# Patient Record
Sex: MALE | Race: WHITE | NOT HISPANIC OR LATINO | URBAN - METROPOLITAN AREA
[De-identification: names, ages, dates, MRNs, and addresses within clinical notes are randomized per-mention and may not be internally consistent; named-entity substitution may affect disease eponyms.]

---

## 2018-06-07 ENCOUNTER — EMERGENCY (EMERGENCY)
Facility: HOSPITAL | Age: 73
LOS: 1 days | Discharge: HOME | End: 2018-06-07
Admitting: EMERGENCY MEDICINE
Payer: MEDICARE

## 2018-06-07 VITALS
OXYGEN SATURATION: 96 % | DIASTOLIC BLOOD PRESSURE: 81 MMHG | RESPIRATION RATE: 17 BRPM | SYSTOLIC BLOOD PRESSURE: 145 MMHG | TEMPERATURE: 98 F | HEART RATE: 76 BPM
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
## 2018-06-07 DIAGNOSIS — S01.81XA LACERATION WITHOUT FOREIGN BODY OF OTHER PART OF HEAD, INITIAL ENCOUNTER: ICD-10-CM
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
## 2018-06-07 DIAGNOSIS — I10 ESSENTIAL (PRIMARY) HYPERTENSION: ICD-10-CM

## 2018-06-07 PROCEDURE — 70450 CT HEAD/BRAIN W/O DYE: CPT | Mod: 26

## 2018-06-07 PROCEDURE — 12013 RPR F/E/E/N/L/M 2.6-5.0 CM: CPT

## 2018-06-07 PROCEDURE — 99284 EMERGENCY DEPT VISIT MOD MDM: CPT | Mod: 25

## 2018-06-07 NOTE — ED PROVIDER NOTE - MEDICAL DECISION MAKING DETAILS
s/p mechanical fall trip and fall, here with lac to chin, no other injuries, tetanus utd, ct brain neg, lac repaired successfully with no complications, return in 7 days for suture removal, wound care discussed, strict return precautions discussed

## 2018-06-07 NOTE — ED PROVIDER NOTE - OBJECTIVE STATEMENT
71 yo M with hx hypothyroidism, copd, HTN on baby asa, here s/p trip and fall, sustained laceration to chin a few hours ago. No LOC. no headache, no dizziness. no nausea or vomiting. No neck pain, no back pain, no chest pain or abdominal pain. Last tetanus 4 years ago.

## 2018-06-07 NOTE — ED ADULT TRIAGE NOTE - CHIEF COMPLAINT QUOTE
pt with 1cm lac to chin. fell at [facility] in the past hour. denies pain. dressing applied. last tetanus 2014.

## 2018-06-07 NOTE — ED ADULT NURSE NOTE - CHIEF COMPLAINT QUOTE
pt with 1cm lac to chin. fell at [facility] in the past hour. denies pain. dressing applied. last tetanus 2014.

## 2020-08-05 ENCOUNTER — EMERGENCY (EMERGENCY)
Facility: HOSPITAL | Age: 75
LOS: 1 days | Discharge: HOME | End: 2020-08-05
Attending: EMERGENCY MEDICINE | Admitting: EMERGENCY MEDICINE
Payer: MEDICARE

## 2020-08-05 VITALS
TEMPERATURE: 98 F | DIASTOLIC BLOOD PRESSURE: 77 MMHG | HEART RATE: 86 BPM | WEIGHT: 188.94 LBS | RESPIRATION RATE: 18 BRPM | OXYGEN SATURATION: 98 % | SYSTOLIC BLOOD PRESSURE: 137 MMHG

## 2020-08-05 VITALS
OXYGEN SATURATION: 97 % | SYSTOLIC BLOOD PRESSURE: 133 MMHG | DIASTOLIC BLOOD PRESSURE: 77 MMHG | RESPIRATION RATE: 18 BRPM | TEMPERATURE: 98 F | HEART RATE: 66 BPM

## 2020-08-05 DIAGNOSIS — S00.81XA ABRASION OF OTHER PART OF HEAD, INITIAL ENCOUNTER: ICD-10-CM
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
## 2020-08-05 DIAGNOSIS — S62.603A FRACTURE OF UNSPECIFIED PHALANX OF LEFT MIDDLE FINGER, INITIAL ENCOUNTER FOR CLOSED FRACTURE: ICD-10-CM
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
## 2020-08-05 PROCEDURE — 73110 X-RAY EXAM OF WRIST: CPT | Mod: 26,LT

## 2020-08-05 PROCEDURE — 73130 X-RAY EXAM OF HAND: CPT | Mod: 26,LT

## 2020-08-05 PROCEDURE — 99284 EMERGENCY DEPT VISIT MOD MDM: CPT | Mod: 25

## 2020-08-05 PROCEDURE — 29125 APPL SHORT ARM SPLINT STATIC: CPT

## 2020-08-05 RX ORDER — ONDANSETRON 8 MG/1
4 TABLET, FILM COATED ORAL ONCE
Refills: 0 | Status: DISCONTINUED | OUTPATIENT
Start: 2020-08-05 | End: 2020-08-05

## 2020-08-05 NOTE — ED PROVIDER NOTE - CARE PROVIDER_API CALL
Mona Can  PLASTIC SURGERY  [address] [place] [id number]  Phone: [phone]  Fax: [phone]  Follow Up Time: Lucia Logan  PLASTIC SURGERY  [address] [place] [address]  Phone: [phone]  Fax: [phone]  Follow Up Time:

## 2020-08-05 NOTE — ED ADULT TRIAGE NOTE - CHIEF COMPLAINT QUOTE
pt ambulates with a baseline shuffle " secondary to psych meds" pt with a mechanical fall with injury to left wrist and hand- Pt denies head, neck, back injury. takes asa

## 2020-08-05 NOTE — ED PROVIDER NOTE - OBJECTIVE STATEMENT
73 yo M w/ history of psych disorder on medication w/ baseline shuffle (gait) due to meds presents to the ED with left wrist and left 3rd digit injury w/ swelling and bruising to L 3rd digit s/p mechanical fall today. Pt states he was walking fast when he tripped and fell, landing on his left hand - no prodromal symptoms. No LOC, visual changes, headstrike, N/V, or other injury. Pt with abrasion to left face, but attributes abrasion to wearing a mask. Pt endorses frequent falls 6-8 months ago that resolved after medication change. Pt takes aspirin daily.

## 2020-08-05 NOTE — ED PROVIDER NOTE - MUSCULOSKELETAL, MLM
Normal distal motor and sensory of the median, ulnar and radial nerves.  Motor intact for ED, JOE, EDM of the involved digit excluding thumb.  AP, APB, APL intact for thumb.  Sensation intact to light touch of the involved digit.  Cap refill < 3 seconds.

## 2020-08-05 NOTE — ED ADULT NURSE NOTE - OBJECTIVE STATEMENT
Pt lost his balance and fell, breaking his fall with hands. C/o bilateral hand pain worse on Rt side. Hematoma noted to base of Rt middle finger. Denies head strike or LOC

## 2020-08-05 NOTE — ED PROVIDER NOTE - NSFOLLOWUPINSTRUCTIONS_ED_ALL_ED_FT
:  [facility]             SPLINT CARE - AfterCare(R) Instructions(ER/ED)     Splint Care    WHAT YOU NEED TO KNOW:    Splint care is important to help protect your splint until it comes off. Some splints are made of fiberglass or plaster that will need to dry and harden. Splint care will help the splint dry and harden correctly. Even after your splint hardens, it can be damaged.    Call to schedule an appointment within 1 week    DISCHARGE INSTRUCTIONS:    Return to the emergency department if:     You have increased pain.      Your fingers or toes are numb or tingling.      You feel burning or stinging around your injury.      Your nails, fingers, or toes turn pale, blue, or gray, and feel cold.      You have new or increased trouble moving your fingers or toes.      Your swelling gets worse.      The skin under your splint is bleeding or leaking pus.     Contact your healthcare provider if:     Your hard splint gets wet or is damaged.      You have a fever.      Your splint feels tighter.      You have itchy, dry skin under your splint that is getting worse.      The skin under your splint is red, or you have a new sore.      You notice a bad smell coming from your splint.       You have questions or concerns about your condition or care.    How to care for your splint:     Wait for your hard splint to harden completely. You may have to wait up to 3 days before you can walk on a plaster splint.      Check your splint and the skin around it each day. Check your splint for damage, such as cracks and breaks. Check your skin for redness, increased swelling, and sores. Loosen the elastic bandage around your splint if it feels too tight.      Keep your splint clean and dry. Keep dirt out of your splint. Before you bathe, wrap your hard splint with 2 layers of plastic. Then put a plastic bag over it. Keep the plastic bag tightly sealed. You can also ask your healthcare provider about waterproof shields. Do not put your hard splint in the water, even with a plastic bag over it. A wet splint can make your skin itchy, and may lead to infection.      Do not put powders or deodorants inside your splint. These can dry your skin and increase itching.       Do not try to scratch the skin inside your hard splint with sharp objects. Sharp objects can break off inside your splint or hurt your skin.       Do not pull the padding out of your splint. The padding inside your splint protects your skin. You may develop a sore on your skin if you take out the padding.    Follow up with your healthcare provider as directed within 1 to 2 weeks: Write down your questions so you remember to ask them during your visits.        © Copyright [employer] 2020       back to top                      © Copyright [employer] 2020

## 2020-08-05 NOTE — ED ADULT NURSE NOTE - NSIMPLEMENTINTERV_GEN_ALL_ED
Implemented All Fall Risk Interventions:  [place] to call system. Call bell, personal items and telephone within reach. Instruct patient to call for assistance. Room bathroom lighting operational. Non-slip footwear when patient is off stretcher. Physically safe environment: no spills, clutter or unnecessary equipment. Stretcher in lowest position, wheels locked, appropriate side rails in place. Provide visual cue, wrist band, yellow gown, etc. Monitor gait and stability. Monitor for mental status changes and reorient to person, place, and time. Review medications for side effects contributing to fall risk. Reinforce activity limits and safety measures with patient and family.

## 2020-08-05 NOTE — ED PROVIDER NOTE - CLINICAL SUMMARY MEDICAL DECISION MAKING FREE TEXT BOX
Pt presents to the ED w/ left hand/wrist injury s/p mechanical fall today. Pt is not complaining of pain and declined analgesics. Will order left hand and wrist xray to rule out fracture. will reassess. Pt presents to the ED w/ left hand/wrist injury s/p mechanical fall today. Pt is not complaining of pain and declined analgesics. Will order left hand and wrist xray to rule out fracture. will reassess.    Patient with L third phalynx fracture. Discussed with hand, splint and outpatient follow up. Patient refusing pain medication. Splinted by me.

## 2020-08-05 NOTE — ED PROVIDER NOTE - PATIENT PORTAL LINK FT
You can access the FollowMyHealth Patient Portal offered by [facility] by registering at the following website: [email or web address]. By joining [employer]’s FollowMyHealth portal, you will also be able to view your health information using other applications (apps) compatible with our system.

## 2020-09-10 NOTE — ED PROCEDURE NOTE - CPROC ED POST RADIOGRAPHY1
Attempting to reach patient for a follow up care coordination call regarding any needs, questions or concerns.   Ann Mcpherson, [address]
extremity correctly positioned, splinted/post procedure radiography not performed

## 2021-03-03 ENCOUNTER — EMERGENCY (EMERGENCY)
Facility: HOSPITAL | Age: 76
LOS: 1 days | Discharge: HOME | End: 2021-03-03
Attending: EMERGENCY MEDICINE | Admitting: EMERGENCY MEDICINE
Payer: MEDICARE

## 2021-03-03 VITALS
WEIGHT: 175.05 LBS | HEART RATE: 92 BPM | RESPIRATION RATE: 18 BRPM | SYSTOLIC BLOOD PRESSURE: 174 MMHG | OXYGEN SATURATION: 94 % | HEIGHT: 70 IN | DIASTOLIC BLOOD PRESSURE: 71 MMHG | TEMPERATURE: 98 F

## 2021-03-03 VITALS
OXYGEN SATURATION: 98 % | DIASTOLIC BLOOD PRESSURE: 78 MMHG | SYSTOLIC BLOOD PRESSURE: 188 MMHG | TEMPERATURE: 98 F | RESPIRATION RATE: 18 BRPM | HEART RATE: 86 BPM

## 2021-03-03 DIAGNOSIS — J45.909 UNSPECIFIED ASTHMA, UNCOMPLICATED: ICD-10-CM

## 2021-03-03 DIAGNOSIS — K40.90 UNILATERAL INGUINAL HERNIA, WITHOUT OBSTRUCTION OR GANGRENE, NOT SPECIFIED AS RECURRENT: ICD-10-CM

## 2021-03-03 DIAGNOSIS — E78.5 HYPERLIPIDEMIA, UNSPECIFIED: ICD-10-CM

## 2021-03-03 DIAGNOSIS — E03.9 HYPOTHYROIDISM, UNSPECIFIED: ICD-10-CM

## 2021-03-03 DIAGNOSIS — R10.31 RIGHT LOWER QUADRANT PAIN: ICD-10-CM

## 2021-03-03 LAB
ALBUMIN SERPL ELPH-MCNC: 3.8 G/DL — SIGNIFICANT CHANGE UP (ref 3.4–5)
ALP SERPL-CCNC: 68 U/L — SIGNIFICANT CHANGE UP (ref 40–120)
ALT FLD-CCNC: 32 U/L — SIGNIFICANT CHANGE UP (ref 12–42)
ANION GAP SERPL CALC-SCNC: 7 MMOL/L — LOW (ref 9–16)
APTT BLD: 33.4 SEC — SIGNIFICANT CHANGE UP (ref 27.5–35.5)
AST SERPL-CCNC: 44 U/L — HIGH (ref 15–37)
BASOPHILS # BLD AUTO: 0.04 K/UL — SIGNIFICANT CHANGE UP (ref 0–0.2)
BASOPHILS NFR BLD AUTO: 0.7 % — SIGNIFICANT CHANGE UP (ref 0–2)
BILIRUB SERPL-MCNC: 0.3 MG/DL — SIGNIFICANT CHANGE UP (ref 0.2–1.2)
BUN SERPL-MCNC: 25 MG/DL — HIGH (ref 7–23)
CALCIUM SERPL-MCNC: 9.1 MG/DL — SIGNIFICANT CHANGE UP (ref 8.5–10.5)
CHLORIDE SERPL-SCNC: 107 MMOL/L — SIGNIFICANT CHANGE UP (ref 96–108)
CO2 SERPL-SCNC: 26 MMOL/L — SIGNIFICANT CHANGE UP (ref 22–31)
CREAT SERPL-MCNC: 1.36 MG/DL — HIGH (ref 0.5–1.3)
EOSINOPHIL # BLD AUTO: 0.25 K/UL — SIGNIFICANT CHANGE UP (ref 0–0.5)
EOSINOPHIL NFR BLD AUTO: 4.2 % — SIGNIFICANT CHANGE UP (ref 0–6)
GLUCOSE SERPL-MCNC: 115 MG/DL — HIGH (ref 70–99)
HCT VFR BLD CALC: 37.5 % — LOW (ref 39–50)
HGB BLD-MCNC: 12.6 G/DL — LOW (ref 13–17)
IMM GRANULOCYTES NFR BLD AUTO: 0.5 % — SIGNIFICANT CHANGE UP (ref 0–1.5)
INR BLD: 1.04 — SIGNIFICANT CHANGE UP (ref 0.88–1.16)
LIDOCAIN IGE QN: 49 U/L — LOW (ref 73–393)
LYMPHOCYTES # BLD AUTO: 1.03 K/UL — SIGNIFICANT CHANGE UP (ref 1–3.3)
LYMPHOCYTES # BLD AUTO: 17.3 % — SIGNIFICANT CHANGE UP (ref 13–44)
MCHC RBC-ENTMCNC: 30.2 PG — SIGNIFICANT CHANGE UP (ref 27–34)
MCHC RBC-ENTMCNC: 33.6 GM/DL — SIGNIFICANT CHANGE UP (ref 32–36)
MCV RBC AUTO: 89.9 FL — SIGNIFICANT CHANGE UP (ref 80–100)
MONOCYTES # BLD AUTO: 0.56 K/UL — SIGNIFICANT CHANGE UP (ref 0–0.9)
MONOCYTES NFR BLD AUTO: 9.4 % — SIGNIFICANT CHANGE UP (ref 2–14)
NEUTROPHILS # BLD AUTO: 4.06 K/UL — SIGNIFICANT CHANGE UP (ref 1.8–7.4)
NEUTROPHILS NFR BLD AUTO: 67.9 % — SIGNIFICANT CHANGE UP (ref 43–77)
NRBC # BLD: 0 /100 WBCS — SIGNIFICANT CHANGE UP (ref 0–0)
PLATELET # BLD AUTO: 130 K/UL — LOW (ref 150–400)
POTASSIUM SERPL-MCNC: 3.7 MMOL/L — SIGNIFICANT CHANGE UP (ref 3.5–5.3)
POTASSIUM SERPL-SCNC: 3.7 MMOL/L — SIGNIFICANT CHANGE UP (ref 3.5–5.3)
PROT SERPL-MCNC: 6.8 G/DL — SIGNIFICANT CHANGE UP (ref 6.4–8.2)
PROTHROM AB SERPL-ACNC: 12.2 SEC — SIGNIFICANT CHANGE UP (ref 10.6–13.6)
RBC # BLD: 4.17 M/UL — LOW (ref 4.2–5.8)
RBC # FLD: 13.2 % — SIGNIFICANT CHANGE UP (ref 10.3–14.5)
SODIUM SERPL-SCNC: 140 MMOL/L — SIGNIFICANT CHANGE UP (ref 132–145)
WBC # BLD: 5.97 K/UL — SIGNIFICANT CHANGE UP (ref 3.8–10.5)
WBC # FLD AUTO: 5.97 K/UL — SIGNIFICANT CHANGE UP (ref 3.8–10.5)

## 2021-03-03 PROCEDURE — 99284 EMERGENCY DEPT VISIT MOD MDM: CPT

## 2021-03-03 PROCEDURE — 74177 CT ABD & PELVIS W/CONTRAST: CPT | Mod: 26,MH

## 2021-03-03 RX ORDER — IOHEXOL 300 MG/ML
30 INJECTION, SOLUTION INTRAVENOUS ONCE
Refills: 0 | Status: COMPLETED | OUTPATIENT
Start: 2021-03-03 | End: 2021-03-03

## 2021-03-03 RX ORDER — SODIUM CHLORIDE 9 MG/ML
1000 INJECTION INTRAMUSCULAR; INTRAVENOUS; SUBCUTANEOUS ONCE
Refills: 0 | Status: COMPLETED | OUTPATIENT
Start: 2021-03-03 | End: 2021-03-03

## 2021-03-03 RX ADMIN — SODIUM CHLORIDE 1000 MILLILITER(S): 9 INJECTION INTRAMUSCULAR; INTRAVENOUS; SUBCUTANEOUS at 13:17

## 2021-03-03 RX ADMIN — IOHEXOL 30 MILLILITER(S): 300 INJECTION, SOLUTION INTRAVENOUS at 13:17

## 2021-03-03 NOTE — ED ADULT TRIAGE NOTE - CHIEF COMPLAINT QUOTE
Pt sent by PMD for eval of constipation x2 weeks. Pt states he took lactulose 4 days ago with only 2 small bowel movements as result. Pt reports RLQ pain.

## 2021-03-03 NOTE — ED PROVIDER NOTE - PHYSICAL EXAMINATION
VITAL SIGNS: I have reviewed nursing notes and confirm.  CONSTITUTIONAL: Well-developed; well-nourished; in no acute distress.  SKIN: Skin is warm and dry, no acute rash.  HEAD: Normocephalic; atraumatic.  EYES: PERRL, EOM intact; conjunctiva and sclera clear.  ENT: No nasal discharge; airway clear.  NECK: Supple; non tender.  CARD: S1, S2 normal; no murmurs, gallops, or rubs. Regular rate and rhythm.  RESP: No wheezes, rales or rhonchi.  ABD: soft non distention with tenderness to RLQ right lingual area, no hernias noted on exam  EXT: Normal ROM. No clubbing, cyanosis or edema.  NEURO: Alert, oriented. Grossly unremarkable.  PSYCH: Cooperative, appropriate. VITAL SIGNS: I have reviewed nursing notes and confirm.  CONSTITUTIONAL: Well-developed; well-nourished; in no acute distress.  SKIN: Skin is warm and dry, no acute rash.  HEAD: Normocephalic; atraumatic.  EYES: PERRL, EOM intact; conjunctiva and sclera clear.  ENT: No nasal discharge; airway clear.  NECK: Supple; non tender.  CARD: S1, S2 normal; no murmurs, gallops, or rubs. Regular rate and rhythm.  RESP: No wheezes, rales or rhonchi.  ABD: Abdomen is soft, nondistended, with +tenderness to RLQ and right inguinal area. No hernias noted on exam.  EXT: Normal ROM. No clubbing, cyanosis or edema.  NEURO: Alert, oriented. Grossly unremarkable.  PSYCH: Cooperative, appropriate.

## 2021-03-03 NOTE — ED PROVIDER NOTE - OBJECTIVE STATEMENT
74 y/o male with PMHx of schizoaffective disorder, hypothyroidism, Hyperlipidemia, BPH, and Asthma presents to the ED complaining of RLQ pain for the past few days. Pt reports he has been constipated over the last 2 weeks and reports only having 2 small BM recently, 2/2 lactulose. However, patient became concerned when he developed RLQ pain just above the right inguinal line. Patient denies any hernias or visual bulging around the area. Pain is exacerbate by pressure or palpation. Denies any associated fevers chills N/V, anorexia, dysuria, hematuria or other concerns. Patient was [facility]. 76 y/o male with PMHx of schizoaffective disorder, hypothyroidism, HLD, BPH, and asthma presents to the ED complaining of RLQ pain for the past few days. Pt reports he has been constipated over the last 2 weeks and reports only having 2 small BM recently 2/2 lactulose. However, Patient became concerned when he developed RLQ pain just above the right inguinal line. Patient denies any hernias or visual bulging around the area. Pain is exacerbated by pressure or palpation. Denies any associated fevers, chills, N/V, anorexia, dysuria, hematuria or other concerns. Patient was sent from [facility].

## 2021-03-03 NOTE — ED PROVIDER NOTE - CLINICAL SUMMARY MEDICAL DECISION MAKING FREE TEXT BOX
DDx includes appendicitis vs. constipation vs. small inguinal hernia that is not evident on exam. Will perform labs, CT Abdominal/Pelvis with IV and PO contrast. DDx includes appendicitis vs. constipation vs. small inguinal hernia that is not evident on exam. Will perform labs and CT abdomen/pelvis with IV and PO contrast.

## 2021-03-03 NOTE — ED PROVIDER NOTE - PATIENT PORTAL LINK FT
You can access the FollowMyHealth Patient Portal offered by [facility] by registering at the following website: [email or web address]. By joining [employer]’s FollowMyHealth portal, you will also be able to view your health information using other applications (apps) compatible with our system.

## 2021-03-03 NOTE — ED PROVIDER NOTE - PROGRESS NOTE DETAILS
All results and hernia dx discussed with pt.  He will get f/u with surgery - consult placed with care coordinator.  Discussed care of hernia and emergent return precautions.

## 2021-03-03 NOTE — ED PROVIDER NOTE - PMH
Asthma    BPH (benign prostatic hyperplasia)    Hyperlipidemia    Hypothyroidism    Schizoaffective disorder

## 2021-03-03 NOTE — ED PROVIDER NOTE - CHPI ED SYMPTOMS NEG
, No anorexia, no hematuria/no dysuria/no fever/no hematuria/no nausea/no vomiting/no chills no anorexia, no hematuria/no dysuria/no fever/no hematuria/no nausea/no vomiting/no chills

## 2021-03-03 NOTE — ED PROVIDER NOTE - NSFOLLOWUPINSTRUCTIONS_ED_ALL_ED_FT
PLEASE FOLLOW-UP WITH OUR GENERAL SURGEONS SO THEY CAN EVALUATE YOUR HERNIA.   YOU MAY CALL [phone] AND ASK FOR MS. LAZARADARNELL WALLACE.  SHE CAN HELP YOU MAKE A FOLLOW-UP APPOINTMENT.    -TAKE OVER THE COUNTER TYLENOL 650MG BY MOUTH EVERY 4-6 HOURS AS NEEDED FOR PAIN.  DO NOT MIX WITH ALCOHOL OR OTHER PRESCRIPTION MEDICATIONS THAT ALREADY CONTAIN TYLENOL OR ACETAMINOPHEN.   -PLEASE RETURN TO THE ER IMMEDIATELY OR CALL 911 FOR ANY HIGH FEVER, TROUBLE BREATHING, VOMITING, SEVERE PAIN, OR ANY OTHER CONCERNS.      Inguinal Hernia, Adult    An inguinal hernia develops when fat or the intestines push through a weak spot in a muscle where your leg meets your lower abdomen (groin). This creates a bulge. This kind of hernia could also be:  •In your scrotum, if you are male.      •In folds of skin around your vagina, if you are female.    There are three types of inguinal hernias:  •Hernias that can be pushed back into the abdomen (are reducible). This type rarely causes pain.      •Hernias that are not reducible (are incarcerated).      •Hernias that are not reducible and lose their blood supply (are strangulated). This type of hernia requires emergency surgery.        What are the causes?  This condition is caused by having a weak spot in the muscles or tissues in the groin. This weak spot develops over time. The hernia may poke through the weak spot when you suddenly strain your lower abdominal muscles, such as when you:  •Lift a heavy object.      •Strain to have a bowel movement. Constipation can lead to straining.      •Cough.        What increases the risk?  This condition is more likely to develop in:  •Men.      •Pregnant women.    •People who:  •Are overweight.      •Work in jobs that require long periods of standing or heavy lifting.      •Have had an inguinal hernia before.      •Smoke or have lung disease. These factors can lead to long-lasting (chronic) coughing.          What are the signs or symptoms?  Symptoms may depend on the size of the hernia. Often, a small inguinal hernia has no symptoms. Symptoms of a larger hernia may include:  •A lump in the groin area. This is easier to see when standing. It might not be visible when lying down.      •Pain or burning in the groin. This may get worse when lifting, straining, or coughing.      •A dull ache or a feeling of pressure in the groin.      •In men, an unusual lump in the scrotum.    Symptoms of a strangulated inguinal hernia may include:  •A bulge in your groin that is very painful and tender to the touch.      •A bulge that turns red or purple.      •Fever, nausea, and vomiting.      •Inability to have a bowel movement or to pass gas.        How is this diagnosed?    This condition is diagnosed based on your symptoms, your medical history, and a physical exam. Your health care provider may feel your groin area and ask you to cough.      How is this treated?    Treatment depends on the size of your hernia and whether you have symptoms. If you do not have symptoms, your health care provider may have you watch your hernia carefully and have you come in for follow-up visits. If your hernia is large or if you have symptoms, you may need surgery to repair the hernia.      Follow these instructions at home:    Lifestyle     •Avoid lifting heavy objects.      •Avoid standing for long periods of time.      • Do not use any products that contain nicotine or tobacco, such as cigarettes and e-cigarettes. If you need help quitting, ask your health care provider.      •Maintain a healthy weight.      Preventing constipation   •Take actions to prevent constipation. Constipation leads to straining with bowel movements, which can make a hernia worse or cause a hernia repair to break down. Your health care provider may recommend that you:  •Drink enough fluid to keep your urine pale yellow.      •Eat foods that are high in fiber, such as fresh fruits and vegetables, whole grains, and beans.      •Limit foods that are high in fat and processed sugars, such as fried or sweet foods.      •Take an over-the-counter or prescription medicine for constipation.        General instructions     •You may try to push the hernia back in place by very gently pressing on it while lying down. Do not try to force the bulge back in if it will not push in easily.      •Watch your hernia for any changes in shape, size, or color. Get help right away if you notice any changes.      •Take over-the-counter and prescription medicines only as told by your health care provider.      •Keep all follow-up visits as told by your health care provider. This is important.        Contact a health care provider if:    •You have a fever.      •You develop new symptoms.      •Your symptoms get worse.        Get help right away if:    •You have pain in your groin that suddenly gets worse.    •You have a bulge in your groin that:  •Suddenly gets bigger and does not get smaller.      •Becomes red or purple or painful to the touch.        •You are a man and you have a sudden pain in your scrotum, or the size of your scrotum suddenly changes.      •You cannot push the hernia back in place by very gently pressing on it when you are lying down. Do not try to force the bulge back in if it will not push in easily.      •You have nausea or vomiting that does not go away.      •You have a fast heartbeat.      •You cannot have a bowel movement or pass gas.      These symptoms may represent a serious problem that is an emergency. Do not wait to see if the symptoms will go away. Get medical help right away. Call your local emergency services (911 in the [place]       Summary    •An inguinal hernia develops when fat or the intestines push through a weak spot in a muscle where your leg meets your lower abdomen (groin).      •This condition is caused by having a weak spot in muscles or tissue in your groin.      •Symptoms may depend on the size of the hernia, and they may include pain or swelling in your groin. A small inguinal hernia often has no symptoms.      •Treatment may not be needed if you do not have symptoms. If you have symptoms or a large hernia, you may need surgery to repair the hernia.      •Avoid lifting heavy objects. Also avoid standing for long amounts of time.      This information is not intended to replace advice given to you by your health care provider. Make sure you discuss any questions you have with your health care provider.

## 2021-03-03 NOTE — ED ADULT NURSE NOTE - OBJECTIVE STATEMENT
Pt is a 75y male complaining of RLQ pain. Pt states that he has been constipated x 10 days. Pt states that he saw his PCP and was prescribed lactulose. Pt states lbm was 5 days ago. Pt denies nausea, vomiting, diarrhea.

## 2021-06-28 NOTE — ED PROVIDER NOTE - CARDIOVASCULAR NEGATIVE STATEMENT, MLM
"Progress Notes by Cain Worthy MD at 2/11/2020  9:20 AM     Author: Cain Worthy MD Service: -- Author Type: Physician    Filed: 2/11/2020 11:07 AM Encounter Date: 2/11/2020 Status: Signed    : Cain Worthy MD (Physician)         .      Assessment/Recommendations   Patient with known hypertension for at least 5 years who has been treated with enalapril once daily 2.5 mg.  His blood pressures been creeping up and he has several blood pressures greater than 150 and some in the 170s with diastolics close to 100.  He has not had blood work recently and also has a mildly elevated calcium score in 2013 at 17.    I have recommend that we increase his antihypertensive regimen and discontinue his enalapril and start him on lisinopril 5 mg each day.  I have asked him to call us with some blood pressures after his been on this medication for a week or 2 and we can decide if he needs a larger dose.  We will get a complete metabolic panel, CBC, urinalysis and a testosterone level as well as a fasting lipid panel tomorrow.  We will call him with the results and further recommendations.  With the increased dose of lisinopril I would recommend repeating a basic metabolic panel in a couple of weeks.    I have strongly encouraged him to do exercise for 30 minutes, at least 5 times a week.  We talked also about reducing carbohydrates in his diet and substituting protein.  He will take these recommendations under advisement but I think he is motivated to have some lifestyle changes.    I have recommended that he read the book \"the science of a long life\" by Dr. Lupe Martinez.    I would like to see him back in 6 months, but of course would be happy to see him sooner if questions or problems arise.       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 male with known history of hypertension for at least 5 years.  He was placed on enalapril about 5 years ago when he had an orchiectomy " from a torsion issue.  He believes that over the last 5 years he is gained about 30 pounds.  He maintains an active lifestyle with snow shoveling and mowing the lawn and being physically active but does not exercise on a routine basis and does not have an exercise routine.  He reports that he has been to the dentist a couple of times and his blood pressures been significantly elevated.  Diastolic pressures were close to 100 and systolic pressures into the 170s or even higher.  He brought a blood pressure cuff and he has blood pressures which are consistently above 150 systolic and oftentimes in the 90s diastolic.    He feels well.  He denies unusual dyspnea on exertion and also denies chest discomfort or chest tightness of any type.  He denies orthopnea, paroxysmal nocturnal dyspnea, peripheral edema, syncope or near syncopal episodes.  In the past he has had palpitations but of late he is not really had any palpitations.    He has been treated for hypertension, his father has coronary artery disease which is diagnosed late in life.  The patient is not diabetic, does not smoke cigarettes and has been on our atorvastatin for his lipids but has not had a lipid panel for some time.  He typically will go for an executive physical at the [facility] every couple of years.    He did have a calcium score of 17 almost all of which was in the proximal LAD according to the report from the [facility] in 2013.  He had a stress test thereafter which he tells me was unremarkable although I cannot view the report in care everywhere.    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    He is , but does not have children.           Physical Examination Review of Systems   Vitals:    02/11/20 0909   BP: 128/74   Pulse: 72   Resp: 16     There is no height or weight on file to  calculate BMI.  Wt Readings from Last 3 Encounters:   02/11/20 (!) 247 lb (112 kg)     General Appearance:   Alert, cooperative and in no acute distress.   ENT/Mouth: Oral mucuos membranes pink and moist .      EYES:  no scleral icterus, normal conjunctivae   Neck: JVP normal. No Hepatojugular reflux. Thyroid not visualized.   Chest/Lungs:   Lungs are clear to auscultation, equal chest wall expansion.   Cardiovascular:   S1, S2 without murmur ,clicks or rubs. Brachial, radial and posterior tibial pulses are intact and symetric. No carotid bruits noted   Abdomen:  Nontender. BS+. No bruits.   Extremities: No cyanosis, clubbing or edema   Skin: no xanthelasma, warm.    Neurologic: normal  bilateral, no tremors     Psychiatric: Appropriate affect.      General: WNL  Eyes: WNL  Ears/Nose/Throat: WNL  Lungs: WNL  Heart: WNL  Stomach: WNL  Bladder: WNL  Muscle/Joints: WNL  Skin: WNL  Nervous System: WNL  Mental Health: WNL     Blood: WNL       Medical History  Surgical History Family History Social History   No past medical history on file. No past surgical history on file.   Status post orchiectomy No family history on file.     Father has had bypass surgery Social History     Socioeconomic History   ? Marital status: Unknown     Spouse name: Not on file   ? Number of children: Not on file   ? Years of education: Not on file   ? Highest education level: Not on file   Occupational History   ? Not on file   Social Needs   ? Financial resource strain: Not on file   ? Food insecurity:     Worry: Not on file     Inability: Not on file   ? Transportation needs:     Medical: Not on file     Non-medical: Not on file   Tobacco Use   ? Smoking status: Passive Smoke Exposure - Never Smoker   ? Smokeless tobacco: Never Used   Substance and Sexual Activity   ? Alcohol use: Not on file   ? Drug use: Not on file   ? Sexual activity: Not on file   Lifestyle   ? Physical activity:     Days per week: Not on file     Minutes per  session: Not on file   ? Stress: Not on file   Relationships   ? Social connections:     Talks on phone: Not on file     Gets together: Not on file     Attends Anabaptist service: Not on file     Active member of club or organization: Not on file     Attends meetings of clubs or organizations: Not on file     Relationship status: Not on file   ? Intimate partner violence:     Fear of current or ex partner: Not on file     Emotionally abused: Not on file     Physically abused: Not on file     Forced sexual activity: Not on file   Other Topics Concern   ? Not on file   Social History Narrative   ? Not on file          Medications  Allergies   Current Outpatient Medications   Medication Sig Dispense Refill   ? atorvastatin (LIPITOR) 10 MG tablet Take 40 mg by mouth.     ? finasteride (PROSCAR) 5 mg tablet Take 5 mg by mouth daily.     ? lisinopril (PRINIVIL,ZESTRIL) 5 MG tablet Take 1 tablet (5 mg total) by mouth daily. 90 tablet 3     No current facility-administered medications for this visit.     No Known Allergies      Lab Results    Chemistry/lipid CBC Cardiac Enzymes/BNP/TSH/INR   No results found for: CHOL, HDL, LDLCALC, TRIG, CREATININE, BUN, K, NA, CL, CO2 No results found for: WBC, HGB, HCT, MCV, PLT No results found for: CKTOTAL, CKMB, CKMBINDEX, TROPONINI, BNP, TSH, INR                                              no chest pain and no edema.

## 2021-07-06 ENCOUNTER — EMERGENCY (EMERGENCY)
Facility: HOSPITAL | Age: 76
LOS: 1 days | Discharge: HOME | End: 2021-07-06
Admitting: EMERGENCY MEDICINE
Payer: MEDICARE

## 2021-07-06 VITALS
HEART RATE: 84 BPM | RESPIRATION RATE: 16 BRPM | TEMPERATURE: 98 F | DIASTOLIC BLOOD PRESSURE: 74 MMHG | OXYGEN SATURATION: 95 % | SYSTOLIC BLOOD PRESSURE: 108 MMHG | HEIGHT: 70 IN

## 2021-07-06 DIAGNOSIS — Z23 ENCOUNTER FOR IMMUNIZATION: ICD-10-CM
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
## 2021-07-06 DIAGNOSIS — E78.5 HYPERLIPIDEMIA, UNSPECIFIED: ICD-10-CM

## 2021-07-06 DIAGNOSIS — S01.81XA LACERATION WITHOUT FOREIGN BODY OF OTHER PART OF HEAD, INITIAL ENCOUNTER: ICD-10-CM

## 2021-07-06 DIAGNOSIS — J45.909 UNSPECIFIED ASTHMA, UNCOMPLICATED: ICD-10-CM

## 2021-07-06 DIAGNOSIS — F25.9 SCHIZOAFFECTIVE DISORDER, UNSPECIFIED: ICD-10-CM
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
## 2021-07-06 DIAGNOSIS — E03.9 HYPOTHYROIDISM, UNSPECIFIED: ICD-10-CM

## 2021-07-06 DIAGNOSIS — N40.0 BENIGN PROSTATIC HYPERPLASIA WITHOUT LOWER URINARY TRACT SYMPTOMS: ICD-10-CM

## 2021-07-06 PROCEDURE — 99284 EMERGENCY DEPT VISIT MOD MDM: CPT

## 2021-07-06 RX ORDER — TETANUS TOXOID, REDUCED DIPHTHERIA TOXOID AND ACELLULAR PERTUSSIS VACCINE, ADSORBED 5; 2.5; 8; 8; 2.5 [IU]/.5ML; [IU]/.5ML; UG/.5ML; UG/.5ML; UG/.5ML
0.5 SUSPENSION INTRAMUSCULAR ONCE
Refills: 0 | Status: COMPLETED | OUTPATIENT
Start: 2021-07-06 | End: 2021-07-06

## 2021-07-06 RX ADMIN — TETANUS TOXOID, REDUCED DIPHTHERIA TOXOID AND ACELLULAR PERTUSSIS VACCINE, ADSORBED 0.5 MILLILITER(S): 5; 2.5; 8; 8; 2.5 SUSPENSION INTRAMUSCULAR at 12:49

## 2021-07-06 NOTE — ED PROVIDER NOTE - MUSCULOSKELETAL, MLM
Spine appears normal, no midline vertebral tenderenss of stepoff deformity, range of motion is not limited, no muscle or joint tenderness

## 2021-07-06 NOTE — ED ADULT NURSE NOTE - NSIMPLEMENTINTERV_GEN_ALL_ED
Implemented All Universal Safety Interventions:  [place] to call system. Call bell, personal items and telephone within reach. Instruct patient to call for assistance. Room bathroom lighting operational. Non-slip footwear when patient is off stretcher. Physically safe environment: no spills, clutter or unnecessary equipment. Stretcher in lowest position, wheels locked, appropriate side rails in place.

## 2021-07-06 NOTE — ED ADULT TRIAGE NOTE - CHIEF COMPLAINT QUOTE
laceration to left brow after his TV fell on his head, denies any LOC. Denies blood thinner use, take ASA daily laceration to left brow after his TV fell on his head, denies any LOC.  take ASA daily

## 2021-07-06 NOTE — ED PROVIDER NOTE - CARE PROVIDER_API CALL
Jordan Arevalo)  Plastic Surgery  [address], [address]  [place] [address]  Phone: [phone]  Fax: [phone]  Follow Up Time:    DISPLAY PLAN FREE TEXT

## 2021-07-06 NOTE — ED PROVIDER NOTE - EYES NEGATIVE STATEMENT, MLM
Patient is an 86 y/o female admitted to [facility] due to S/P fall. X-ray of R hip and CT of RLE showed acute minimally displaced fracture of right greater trochanter. no pain, no redness, and no visual changes.

## 2021-07-06 NOTE — ED PROVIDER NOTE - NSFOLLOWUPINSTRUCTIONS_ED_ALL_ED_FT

## 2021-07-06 NOTE — ED PROVIDER NOTE - NEUROLOGICAL, MLM
Alert and oriented, no focal deficits, no motor or sensory deficits. ambulating with normal steady gait.

## 2021-07-06 NOTE — ED PROVIDER NOTE - OBJECTIVE STATEMENT
76 y/o male with PMHx of schizoaffective disorder, hypothyroidism, HLD, BPH, and asthma presents to the ED with c/o laceration over left eyebrow. at 3am  pt was getting out of bed and bumped his TV which tipped, striking him in the forehead. denies any falls or other injuries related to the incident. pt denies headache, LOC, dizziness, neck or back pain, vision changes, nausea, vomiting, numbness, tingling, weakness. last tetanus unknown. takes baby aspirin, no other AC.

## 2021-07-06 NOTE — ED PROVIDER NOTE - ENMT, MLM
Airway patent, no tenderness to facial bones including nose, orbits, maxilla/mandible, zygomatic arch, 1cm of erythema/bruising to bridge of nose

## 2021-07-06 NOTE — ED PROVIDER NOTE - EYES, MLM
Clear bilaterally, pupils equal, round and reactive to light. EOMI and painless without nystagmus at extremis

## 2021-07-06 NOTE — ED PROVIDER NOTE - SKIN, MLM
Skin normal color for race, warm, dry and intact. 1.5cm deep laceration above the left eyebrow without any active bleeding or tenderness.

## 2021-07-06 NOTE — ED PROVIDER NOTE - PROGRESS NOTE DETAILS
called Dr. Jacey Thao (plastics) who did not answer. Dr. Arevalo already in ED. will close laceration. lac repair completed. will d/c.

## 2021-07-06 NOTE — ED PROVIDER NOTE - CLINICAL SUMMARY MEDICAL DECISION MAKING FREE TEXT BOX
pt presents today with laceration to left forehead sustained 9 hours PTA when he bumped his TV and it fell forward, striking his forehead. pt denies any falls or other injuries and has no neuro complaints. pt is on baby aspirin but no other AC. pt is 9 hours s/p injury with no neuro complaints. will refrain from imaging. tetanus unknown. Dr. Arevalo repaired lac as plastics was not immediately available.

## 2022-01-01 NOTE — ED ADULT NURSE NOTE - MUSCULOSKELETAL WDL
Please go to [facility].    Dr. Cool has called them and informed them that you will be bringing [facility] for evaluation and possible ultrasound.  
Full range of motion of upper and lower extremities, no joint tenderness/swelling.

## 2022-04-06 NOTE — ED ADULT NURSE NOTE - IS THE PATIENT ABLE TO BE SCREENED?
Advance Care Planning     Advance Care Planning (ACP) Physician/NP/PA Conversation      Date of Conversation: 4/6/2022  Conducted with: Patient with Decision Making Capacity    Healthcare Decision Maker:     Primary Decision Maker: Kamla Elias - Keith    Secondary Decision Maker: Odessa Huff - [phone]  Click here to complete [address] including selection of the Healthcare Decision Maker Relationship (ie \"Primary\")        Today we documented Decision Maker(s) consistent with Legal Next of Kin hierarchy. Care Preferences:    Hospitalization: \"If your health worsens and it becomes clear that your chance of recovery is unlikely, what would be your preference regarding hospitalization? \"  The patient would prefer hospitalization. Ventilation: \"If you were unable to breathe on your own and your chance of recovery was unlikely, what would be your preference about the use of a ventilator (breathing machine) if it was available to you? \"   The patient would desire the use of a ventilator. Resuscitation: \"In the event your heart stopped as a result of an underlying serious health condition, would you want attempts to be made to restart your heart, or would you prefer a natural death? \"   Yes, attempt to resuscitate.     Additional topics discussed: treatment goals, benefit/burden of treatment options, ventilation preferences, hospitalization preferences, resuscitation preferences and end of life care preferences (vegetative state/imminent death)    Conversation Outcomes / Follow-Up Plan:   ACP in process - completing/providing documents   Reviewed DNR/DNI and patient elects Full Code (Attempt Resuscitation)     Length of Voluntary ACP Conversation in minutes:  16 minutes    Jamal Manning MD
Yes

## 2022-10-16 ENCOUNTER — EMERGENCY (EMERGENCY)
Facility: HOSPITAL | Age: 77
LOS: 1 days | Discharge: HOME | End: 2022-10-16
Attending: EMERGENCY MEDICINE | Admitting: EMERGENCY MEDICINE

## 2022-10-16 VITALS
HEART RATE: 88 BPM | HEIGHT: 69 IN | SYSTOLIC BLOOD PRESSURE: 135 MMHG | DIASTOLIC BLOOD PRESSURE: 80 MMHG | WEIGHT: 179.9 LBS | TEMPERATURE: 98 F | RESPIRATION RATE: 18 BRPM | OXYGEN SATURATION: 96 %

## 2022-10-16 PROCEDURE — 99283 EMERGENCY DEPT VISIT LOW MDM: CPT

## 2022-10-16 NOTE — ED PROVIDER NOTE - SKIN, MLM
4 brown nevi with no bleeding and regular borders. Nevi are nontender and are on his distal pretibial area bilaterally.

## 2022-10-16 NOTE — ED PROVIDER NOTE - OBJECTIVE STATEMENT
78 y/o Male with no PMHx presenting with bilateral spots. Patient states that a doctor told him to come into the ER for evaluation. Patient states that the 4 spots have been there for a while. Patient denies the area being itchy and painful. Denies fever/chills.

## 2022-10-16 NOTE — ED PROVIDER NOTE - NSICDXPASTMEDICALHX_GEN_ALL_CORE_FT
PAST MEDICAL HISTORY:  Asthma     BPH (benign prostatic hyperplasia)     Hyperlipidemia     Hypothyroidism     Schizoaffective disorder

## 2022-10-16 NOTE — ED ADULT NURSE NOTE - CHIEF COMPLAINT QUOTE
sent from nurse at the [facility] for further eval of lesions noted to bilateral lower extremities. denies pain or itching. no bleeding noted

## 2022-10-16 NOTE — ED ADULT TRIAGE NOTE - CHIEF COMPLAINT QUOTE
sent from nurse at the [facility] for further eval of lesions noted to bilateral lower extremities. denies pain or itching. no bleeding noted

## 2022-10-16 NOTE — ED PROVIDER NOTE - PATIENT PORTAL LINK FT
You can access the FollowMyHealth Patient Portal offered by [facility] by registering at the following website: [email or web address]. By joining [facility]’s FollowMyHealth portal, you will also be able to view your health information using other applications (apps) compatible with our system.

## 2022-10-16 NOTE — ED PROVIDER NOTE - CARE PROVIDER_API CALL
Majo Randle)  Dermatology  [address] [place] [address], [place] [address]  Phone: [phone]  Fax: [phone]  Follow Up Time:

## 2022-10-18 DIAGNOSIS — D22.9 MELANOCYTIC NEVI, UNSPECIFIED: ICD-10-CM

## 2022-10-18 DIAGNOSIS — N40.1 BENIGN PROSTATIC HYPERPLASIA WITH LOWER URINARY TRACT SYMPTOMS: ICD-10-CM

## 2022-10-18 DIAGNOSIS — R21 RASH AND OTHER NONSPECIFIC SKIN ERUPTION: ICD-10-CM

## 2022-10-18 DIAGNOSIS — J45.909 UNSPECIFIED ASTHMA, UNCOMPLICATED: ICD-10-CM

## 2022-10-18 DIAGNOSIS — E03.9 HYPOTHYROIDISM, UNSPECIFIED: ICD-10-CM

## 2022-10-18 DIAGNOSIS — F25.9 SCHIZOAFFECTIVE DISORDER, UNSPECIFIED: ICD-10-CM

## 2022-10-18 DIAGNOSIS — E78.5 HYPERLIPIDEMIA, UNSPECIFIED: ICD-10-CM

## 2023-01-07 ENCOUNTER — INPATIENT (INPATIENT)
Facility: HOSPITAL | Age: 78
LOS: 2 days | Discharge: NURSING, REHAB OR LONG-TERM CARE | DRG: 312 | End: 2023-01-10
Attending: INTERNAL MEDICINE | Admitting: STUDENT IN AN ORGANIZED HEALTH CARE EDUCATION/TRAINING PROGRAM
Payer: MEDICARE

## 2023-01-07 VITALS
RESPIRATION RATE: 18 BRPM | OXYGEN SATURATION: 93 % | SYSTOLIC BLOOD PRESSURE: 128 MMHG | TEMPERATURE: 98 F | HEART RATE: 68 BPM | DIASTOLIC BLOOD PRESSURE: 74 MMHG

## 2023-01-07 DIAGNOSIS — U07.1 COVID-19: ICD-10-CM

## 2023-01-07 DIAGNOSIS — J45.909 UNSPECIFIED ASTHMA, UNCOMPLICATED: ICD-10-CM

## 2023-01-07 DIAGNOSIS — F25.9 SCHIZOAFFECTIVE DISORDER, UNSPECIFIED: ICD-10-CM

## 2023-01-07 DIAGNOSIS — Z90.89 ACQUIRED ABSENCE OF OTHER ORGANS: Chronic | ICD-10-CM

## 2023-01-07 DIAGNOSIS — Z29.9 ENCOUNTER FOR PROPHYLACTIC MEASURES, UNSPECIFIED: ICD-10-CM

## 2023-01-07 DIAGNOSIS — D64.9 ANEMIA, UNSPECIFIED: ICD-10-CM

## 2023-01-07 DIAGNOSIS — N17.9 ACUTE KIDNEY FAILURE, UNSPECIFIED: ICD-10-CM

## 2023-01-07 DIAGNOSIS — E78.5 HYPERLIPIDEMIA, UNSPECIFIED: ICD-10-CM

## 2023-01-07 DIAGNOSIS — E03.9 HYPOTHYROIDISM, UNSPECIFIED: ICD-10-CM

## 2023-01-07 DIAGNOSIS — N40.0 BENIGN PROSTATIC HYPERPLASIA WITHOUT LOWER URINARY TRACT SYMPTOMS: ICD-10-CM

## 2023-01-07 DIAGNOSIS — D69.6 THROMBOCYTOPENIA, UNSPECIFIED: ICD-10-CM

## 2023-01-07 DIAGNOSIS — Z87.438 PERSONAL HISTORY OF OTHER DISEASES OF MALE GENITAL ORGANS: ICD-10-CM
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
## 2023-01-07 LAB
ALBUMIN SERPL ELPH-MCNC: 3.7 G/DL — SIGNIFICANT CHANGE UP (ref 3.4–5)
ALP SERPL-CCNC: 58 U/L — SIGNIFICANT CHANGE UP (ref 40–120)
ALT FLD-CCNC: 25 U/L — SIGNIFICANT CHANGE UP (ref 12–42)
ANION GAP SERPL CALC-SCNC: 9 MMOL/L — SIGNIFICANT CHANGE UP (ref 9–16)
AST SERPL-CCNC: 35 U/L — SIGNIFICANT CHANGE UP (ref 15–37)
BASOPHILS # BLD AUTO: 0.02 K/UL — SIGNIFICANT CHANGE UP (ref 0–0.2)
BASOPHILS NFR BLD AUTO: 0.5 % — SIGNIFICANT CHANGE UP (ref 0–2)
BILIRUB SERPL-MCNC: 0.4 MG/DL — SIGNIFICANT CHANGE UP (ref 0.2–1.2)
BUN SERPL-MCNC: 25 MG/DL — HIGH (ref 7–23)
CALCIUM SERPL-MCNC: 9.1 MG/DL — SIGNIFICANT CHANGE UP (ref 8.5–10.5)
CHLORIDE SERPL-SCNC: 103 MMOL/L — SIGNIFICANT CHANGE UP (ref 96–108)
CK MB BLD-MCNC: 3.01 % — SIGNIFICANT CHANGE UP
CK MB CFR SERPL CALC: 21.3 NG/ML — HIGH (ref 0.5–3.6)
CK SERPL-CCNC: 707 U/L — HIGH (ref 39–308)
CO2 SERPL-SCNC: 28 MMOL/L — SIGNIFICANT CHANGE UP (ref 22–31)
CREAT SERPL-MCNC: 1.5 MG/DL — HIGH (ref 0.5–1.3)
EGFR: 47 ML/MIN/1.73M2 — LOW
EOSINOPHIL # BLD AUTO: 0.03 K/UL — SIGNIFICANT CHANGE UP (ref 0–0.5)
EOSINOPHIL NFR BLD AUTO: 0.7 % — SIGNIFICANT CHANGE UP (ref 0–6)
ETHANOL SERPL-MCNC: <3 MG/DL — SIGNIFICANT CHANGE UP
FLUAV AG NPH QL: SIGNIFICANT CHANGE UP
FLUBV AG NPH QL: SIGNIFICANT CHANGE UP
GLUCOSE SERPL-MCNC: 113 MG/DL — HIGH (ref 70–99)
HCT VFR BLD CALC: 37.8 % — LOW (ref 39–50)
HGB BLD-MCNC: 12.2 G/DL — LOW (ref 13–17)
IMM GRANULOCYTES NFR BLD AUTO: 0.5 % — SIGNIFICANT CHANGE UP (ref 0–0.9)
LYMPHOCYTES # BLD AUTO: 1.21 K/UL — SIGNIFICANT CHANGE UP (ref 1–3.3)
LYMPHOCYTES # BLD AUTO: 28 % — SIGNIFICANT CHANGE UP (ref 13–44)
MCHC RBC-ENTMCNC: 30.1 PG — SIGNIFICANT CHANGE UP (ref 27–34)
MCHC RBC-ENTMCNC: 32.3 GM/DL — SIGNIFICANT CHANGE UP (ref 32–36)
MCV RBC AUTO: 93.3 FL — SIGNIFICANT CHANGE UP (ref 80–100)
MONOCYTES # BLD AUTO: 0.84 K/UL — SIGNIFICANT CHANGE UP (ref 0–0.9)
MONOCYTES NFR BLD AUTO: 19.4 % — HIGH (ref 2–14)
NEUTROPHILS # BLD AUTO: 2.2 K/UL — SIGNIFICANT CHANGE UP (ref 1.8–7.4)
NEUTROPHILS NFR BLD AUTO: 50.9 % — SIGNIFICANT CHANGE UP (ref 43–77)
NRBC # BLD: 0 /100 WBCS — SIGNIFICANT CHANGE UP (ref 0–0)
PLATELET # BLD AUTO: 99 K/UL — LOW (ref 150–400)
POTASSIUM SERPL-MCNC: 3.8 MMOL/L — SIGNIFICANT CHANGE UP (ref 3.5–5.3)
POTASSIUM SERPL-SCNC: 3.8 MMOL/L — SIGNIFICANT CHANGE UP (ref 3.5–5.3)
PROT SERPL-MCNC: 6.7 G/DL — SIGNIFICANT CHANGE UP (ref 6.4–8.2)
RBC # BLD: 4.05 M/UL — LOW (ref 4.2–5.8)
RBC # FLD: 12.5 % — SIGNIFICANT CHANGE UP (ref 10.3–14.5)
RSV RNA NPH QL NAA+NON-PROBE: SIGNIFICANT CHANGE UP
SARS-COV-2 RNA SPEC QL NAA+PROBE: DETECTED
SODIUM SERPL-SCNC: 140 MMOL/L — SIGNIFICANT CHANGE UP (ref 132–145)
TROPONIN I, HIGH SENSITIVITY RESULT: 16.2 NG/L — SIGNIFICANT CHANGE UP
WBC # BLD: 4.32 K/UL — SIGNIFICANT CHANGE UP (ref 3.8–10.5)
WBC # FLD AUTO: 4.32 K/UL — SIGNIFICANT CHANGE UP (ref 3.8–10.5)

## 2023-01-07 PROCEDURE — 99223 1ST HOSP IP/OBS HIGH 75: CPT | Mod: GC

## 2023-01-07 PROCEDURE — 99285 EMERGENCY DEPT VISIT HI MDM: CPT | Mod: CS

## 2023-01-07 PROCEDURE — 72125 CT NECK SPINE W/O DYE: CPT | Mod: 26,MH

## 2023-01-07 PROCEDURE — 71045 X-RAY EXAM CHEST 1 VIEW: CPT | Mod: 26

## 2023-01-07 PROCEDURE — 70450 CT HEAD/BRAIN W/O DYE: CPT | Mod: 26,MH

## 2023-01-07 PROCEDURE — 93010 ELECTROCARDIOGRAM REPORT: CPT

## 2023-01-07 RX ORDER — ENOXAPARIN SODIUM 100 MG/ML
40 INJECTION SUBCUTANEOUS EVERY 24 HOURS
Refills: 0 | Status: DISCONTINUED | OUTPATIENT
Start: 2023-01-07 | End: 2023-01-08

## 2023-01-07 RX ORDER — BUPROPION HYDROCHLORIDE 150 MG/1
150 TABLET, EXTENDED RELEASE ORAL DAILY
Refills: 0 | Status: DISCONTINUED | OUTPATIENT
Start: 2023-01-08 | End: 2023-01-10

## 2023-01-07 RX ORDER — BUDESONIDE AND FORMOTEROL FUMARATE DIHYDRATE 160; 4.5 UG/1; UG/1
2 AEROSOL RESPIRATORY (INHALATION)
Refills: 0 | Status: DISCONTINUED | OUTPATIENT
Start: 2023-01-07 | End: 2023-01-10

## 2023-01-07 RX ORDER — ATORVASTATIN CALCIUM 80 MG/1
20 TABLET, FILM COATED ORAL AT BEDTIME
Refills: 0 | Status: DISCONTINUED | OUTPATIENT
Start: 2023-01-07 | End: 2023-01-10

## 2023-01-07 RX ORDER — LEVOTHYROXINE SODIUM 125 MCG
100 TABLET ORAL DAILY
Refills: 0 | Status: DISCONTINUED | OUTPATIENT
Start: 2023-01-07 | End: 2023-01-10

## 2023-01-07 RX ORDER — ALBUTEROL 90 UG/1
1 AEROSOL, METERED ORAL EVERY 4 HOURS
Refills: 0 | Status: DISCONTINUED | OUTPATIENT
Start: 2023-01-07 | End: 2023-01-10

## 2023-01-07 RX ORDER — SODIUM CHLORIDE 9 MG/ML
1000 INJECTION INTRAMUSCULAR; INTRAVENOUS; SUBCUTANEOUS ONCE
Refills: 0 | Status: COMPLETED | OUTPATIENT
Start: 2023-01-07 | End: 2023-01-07

## 2023-01-07 RX ORDER — TAMSULOSIN HYDROCHLORIDE 0.4 MG/1
0.4 CAPSULE ORAL AT BEDTIME
Refills: 0 | Status: DISCONTINUED | OUTPATIENT
Start: 2023-01-07 | End: 2023-01-10

## 2023-01-07 RX ADMIN — BUDESONIDE AND FORMOTEROL FUMARATE DIHYDRATE 2 PUFF(S): 160; 4.5 AEROSOL RESPIRATORY (INHALATION) at 23:31

## 2023-01-07 RX ADMIN — TAMSULOSIN HYDROCHLORIDE 0.4 MILLIGRAM(S): 0.4 CAPSULE ORAL at 23:36

## 2023-01-07 RX ADMIN — ATORVASTATIN CALCIUM 20 MILLIGRAM(S): 80 TABLET, FILM COATED ORAL at 23:33

## 2023-01-07 RX ADMIN — SODIUM CHLORIDE 2000 MILLILITER(S): 9 INJECTION INTRAMUSCULAR; INTRAVENOUS; SUBCUTANEOUS at 14:12

## 2023-01-07 NOTE — ED PROVIDER NOTE - CLINICAL SUMMARY MEDICAL DECISION MAKING FREE TEXT BOX
78 y/o M presents to the ED s/p reported head trauma while at [facility]. Pt is drowsy at this time [unknown baseline]. Plan for labs and CT imaging. Will reassess clinically after results have been obtained.

## 2023-01-07 NOTE — H&P ADULT - PROBLEM SELECTOR PLAN 11
Fluids: s/p 1L NS  Electrolytes: Replete to keep K>4 and Mg>2  Nutrition: Regular  DVT ppx: Lovenox  GI ppx: None  Code: Full code  Dispo: [facility] Fluids: s/p 1L NS  Electrolytes: Replete to keep K>4 and Mg>2  Nutrition: Regular  DVT ppx: SCDs  GI ppx: None  Code: Full code  Dispo: [facility]

## 2023-01-07 NOTE — PATIENT PROFILE ADULT - FUNCTIONAL ASSESSMENT - BASIC MOBILITY 6.
3-calculated by average/Not able to assess (calculate score using [facility] averaging method)

## 2023-01-07 NOTE — H&P ADULT - PROBLEM SELECTOR PLAN 9
Platelet 99 on admission, unknown baseline. No known history of bone marrow disorder, immune system dysfunction, or known recent viral infection    Plan:  - Monitor platelet  - Transfuse for platelet <50 and bleeding or <10

## 2023-01-07 NOTE — H&P ADULT - PROBLEM SELECTOR PLAN 1
Found down by [facility] staff for unknown period of time. Patient reports no recall of fall but does endorse having intermittent dizziness upon position change (standing from seated position). CTH and CT cervical without acute pathology. EKG with RBBB with 1st degree block. Likely 2/2 dehydration/orthostatic hypotension vs medication sedation vs cardiac etiology    Plan:  - f/u orthostatics  - f/u TTE  - Formal med rec  - f/u UA and utox  - Fall precaution Found down by [facility] staff for unknown period of time. Patient reports no recall of fall but does endorse having intermittent dizziness upon position change (standing from seated position). CTH and CT cervical without acute pathology. EKG with RBBB with 1st degree block. Likely 2/2 dehydration/orthostatic hypotension vs medication sedation vs cardiac etiology    Plan:  - f/u orthostatics  - f/u TTE  - Formal med rec  - f/u UA and utox  - f/u CK in AM  - Fall precaution Found down by [facility] staff for unknown period of time. Patient reports no recall of fall but does endorse having intermittent dizziness upon position change (standing from seated position). CTH and CT cervical without acute pathology. EKG with RBBB with 1st degree block. Likely 2/2 dehydration/orthostatic hypotension vs medication sedation vs cardiac etiology    Plan:  - f/u orthostatics  - f/u TTE  - f/u UA and utox  - f/u CK in AM  - Fall precaution

## 2023-01-07 NOTE — ED ADULT NURSE NOTE - OBJECTIVE STATEMENT
Pt presents to ED from [facility] fa Pt presents to ED from [facility] after being found on the floor secondary ot a fall off a chair. Pt unable to recall if this was syncopal in nature but confirms a HS. Pt appears withdrawn and frequent fall asleep during exam, but is oriented. VSS.

## 2023-01-07 NOTE — PATIENT PROFILE ADULT - STATED REASON FOR ADMISSION
Called pt at number below  No answer unable to LM  Called pt on mobile  Pt states he wanted a new C-PAP machine but was told he had to wait 2 more years   Fall

## 2023-01-07 NOTE — H&P ADULT - NSHPLABSRESULTS_GEN_ALL_CORE
LABS:  01-07 @ 14:27 Creatine 707 U/L<H> [39 - 308]  cret                        12.2   4.32  )-----------( 99       ( 07 Jan 2023 14:08 )             37.8     01-07    140  |  103  |  25<H>  ----------------------------<  113<H>  3.8   |  28  |  1.50<H>    Ca    9.1      07 Jan 2023 14:08    TPro  6.7  /  Alb  3.7  /  TBili  0.4  /  DBili  x   /  AST  35  /  ALT  25  /  AlkPhos  58  01-07

## 2023-01-07 NOTE — H&P ADULT - PROBLEM SELECTOR PLAN 10
Fluids: s/p 1L NS  Electrolytes: Replete to keep K>4 and Mg>2  Nutrition: Regular  DVT ppx: Lovenox  GI ppx: None  Code: Full code  Dispo: [facility] On flomax 0.4mg qhs    Plan:  - c/w flomax 0.4mg qhs

## 2023-01-07 NOTE — H&P ADULT - PROBLEM SELECTOR PLAN 8
Hb 12.2 on admission, unknown baseline. No signs of active bleed. Received 1L NS in ED    Plan:  - Maintain active T&S  - Transfuse for Hb <7

## 2023-01-07 NOTE — PATIENT PROFILE ADULT - FALL HARM RISK - HARM RISK INTERVENTIONS

## 2023-01-07 NOTE — ED ADULT NURSE NOTE - CHIEF COMPLAINT QUOTE
Pt BIBEMS from [facility] after staff found him lying on the floor. AS per pt he tried to get out of his chair and fell. Pt states that he hit his head. Unknown use of blood thinner and loc.Pt complaining of headache and blurry vision at this time.  Pt evaluated in triage by GHANSHYAM Galeano. No stroke code called.

## 2023-01-07 NOTE — H&P ADULT - PROBLEM SELECTOR PLAN 7
Reports taking Advair 250/50 2 puffs BID. Has albuterol but has not needed to use    Plan:  - c/w symbicort 160/4.5 2 puffs BID  - Albuterol PRN

## 2023-01-07 NOTE — H&P ADULT - PROBLEM SELECTOR PLAN 4
Cr 1.50 on admission, unknown baseline. Possibly 2/2 pre-renal etiology in setting of fall and decreased PO intake    Plan:  - Monitor Cr  - Avoid nephrotoxic meds and renally dose meds  - f/u urine lytes

## 2023-01-07 NOTE — ED PROVIDER NOTE - PROGRESS NOTE DETAILS
Reviewed previous charts:     pulse ox ranges from 93-95. SpO2 currently 93 on RA. patient arousable but still continues to be very drowsy. attempted to call [facility] to get collateral information. called number listed on chart for [facility] which is also number provided by patient [phone], non working number

## 2023-01-07 NOTE — H&P ADULT - PROBLEM SELECTOR PLAN 5
On synthroid 100mcg QD. Unknown recent TSH level however no signs of myxedema coma such as hypothermia, respiratory depression, edema    Plan:  - c/w synthroid 100mcg QD  - f/u TSH

## 2023-01-07 NOTE — H&P ADULT - PROBLEM SELECTOR PLAN 3
Per surescripts, patient has picked up benztropine 0.5mg, risperidone 2mg, bupropprion 150mg, fluoxetine 20mg however patient unsure dose or frequency. Unable to reach [facility] facility    Plan:  - Formal med rec  - Restart meds as appropriate. Concern for possible medication induced fall Per surescripts, patient has picked up benztropine 0.5mg, risperidone 2mg, buproprion 150mg, fluoxetine 20mg however patient unsure dose or frequency. Unable to reach [facility]    Plan:  - Formal med rec  - c/w buproprion 150mg QD  - Restart meds as appropriate. Concern for possible medication induced fall Per surescripts, patient has picked up benztropine 0.5mg, risperidone 2mg, buproprion 150mg, fluoxetine 20mg however patient unsure dose or frequency. Unable to reach [facility] facility    Plan:  - Formal med rec  - c/w buproprion 150mg BID  - Restart meds as appropriate. Concern for possible medication induced fall Per surescripts, patient has picked up benztropine 0.5mg, risperidone 2mg, buproprion 150mg, fluoxetine 20mg however patient unsure dose or frequency. Unable to reach [facility]    Plan:  - Formal med rec  - c/w buproprion 150mg QD  - Restart meds as appropriate. Concern for possible medication induced fall Per surescripts, patient has picked up benzatropine 0.5mg, risperidone 2mg, buproprion 150mg, fluoxetine 20mg however patient unsure dose or frequency. Unable to reach [facility] facility    Plan:  - Formal med rec  - c/w buproprion 150mg QD and fluoxetine 20mg BID  - Restart meds as appropriate, holding benzatropin and risperidone. Concern for possible medication induced fall

## 2023-01-07 NOTE — H&P ADULT - NSHPSOCIALHISTORY_GEN_ALL_CORE
Lives at [facility] (20+ years there). No smoking, alcohol, or recreational drug use. Retired, worked as .

## 2023-01-07 NOTE — H&P ADULT - ASSESSMENT
77M PHMx hypothyroidism, schizoaffective disorder, asthma, HLD, BPH presents from [facility] facility for fall after being found down for unknown period of time by staff now admitted for fall work up and COVID management.

## 2023-01-07 NOTE — H&P ADULT - PROBLEM SELECTOR PLAN 2
Incidentally found to be COVID positive on admission. No respiratory symptoms, afebrile, normal WBC, saturating 95%+ on room air.    Plan  - Supportive care, no decadron or redemsivir indicated at this time

## 2023-01-07 NOTE — H&P ADULT - ATTENDING COMMENTS
#Fall: found on floor, unable to recall events, possible syncope. Hx of lightheadedness w/ standing, possibly OH. EKG rbbb, 1st degree av block. Trops neg. No FND on exam, CTH cspine wnl. F/up orthostatics, tsh, TTE, UA, fall precautions    #Covid: on RA, continue to monitor    #[place]: CK mild, possibly prerenal. s/p 1L bolus, consider urine lytes if not improved. Trend bmp, ck

## 2023-01-07 NOTE — H&P ADULT - NSHPPHYSICALEXAM_GEN_ALL_CORE
PHYSICAL EXAM:  GENERAL: Pt lying in bed comfortably in NAD  HEAD:  Atraumatic   EYES: EOMI, PERRL, conjunctiva and sclera clear  ENT: Moist mucous membranes  NECK: Supple, No JVD  CHEST/LUNG: Clear to auscultation bilaterally; No rales, rhonchi, wheezing or rubs. Unlabored respirations  HEART: Regular rate and rhythm; No murmurs, rubs, or gallops  ABDOMEN: Bowel sounds present; Soft, Nontender, Nondistended. No guarding or rigidity    EXTREMITIES:  2+ Peripheral Pulses, brisk capillary refill. No clubbing, cyanosis, or edema  NERVOUS SYSTEM:  Alert & Oriented X3, speech clear. Answers questions appropriately. Facial movements symmetrical, no facial droop, tongue protrusion midline. Full and equal 5/5 strength B/L upper and lower extremities. Sensation intact. No motor drift. No deficits and no tremors noted.  MSK: FROM x 4 extremities  SKIN: No rashes or lesions

## 2023-01-07 NOTE — ED ADULT TRIAGE NOTE - CHIEF COMPLAINT QUOTE
Pt BIBEMS from [facility] after staff found him lying on the floor. AS per pt he tried to get out of his chair and fell. Pt states that he hit his head. Unknown use of blood thinner and loc.Pt complaining of headache and blurry vision at this time.  Pt evaluated in triage by GHANSHYAM Galeano. No stroke code called.

## 2023-01-07 NOTE — H&P ADULT - HISTORY OF PRESENT ILLNESS
CC: fall  HPI: 77M PHMx hypothyroidism, schizoaffective disorder, asthma, HLD, BPH presents from [facility] for fall after being found down for unknown period of time by staff. Patient does not recall falling and does not remember events leading up to his fall and admits to having prior falls as well. He reports having a shuffling gait when asked about his gait (ED provider note Aug 2020 also notes a shuffling gait) and says his ability to ambulate varies but he typically is able to ambulate without walker or cane. Patient reports occasionally having dizziness upon position change such as when he stands from a seated position. Reports the living facility assists him with his medication and he does not recall his psych meds. No recent illness or infection and no recent travel. Denies headache, fever, chills, chest pain, cough, SOB, dyspnea, abd pain, diarrhea, dysuria.    In the ED:    - VS: Tmax: 97.9, HR: 68, BP: 128/74, RR: 18, O2: 93% RA    - Pertinent Labs: , CKMB 21, COVID +, Hb 12.2, Plt 99, BUN 25, Cr 1.50    - Imaging: CXR: possible peripheral opacity  EKG: RBBB with 1st degree AV block, no ischemic changes    - Treatment/interventions: 1L NS    PMHx: hypothyroidism, schizoaffective disorder, asthma, HLD, BPH  PSHx: Tonsillectomy  Meds: See med rec  Allergies: None  Social: see below

## 2023-01-07 NOTE — ED ADULT NURSE NOTE - NS ED PATIENT SAFETY CONCERN
07/05/20 0156   C-SSRS (Frequent Screen)   2. Have you actually had any thoughts of killing yourself? No  (pt sleeping)   Suicide Evaluation Negative screen= no ideation, behaviors or history   Pt asleep for C-SSRS assessment. Maintain current plan of care.    No

## 2023-01-07 NOTE — ED PROVIDER NOTE - OBJECTIVE STATEMENT
78 y/o M with PMH of asthma, hyperlipidemia, hypothyroidism, BPH, and schizoaffective disorder is BIB EMS from [facility] s/p fall. As per EMS, residence staff found him laying on the floor. Pt states he slipped out of the chair with [+] head trauma and PADGETT. At bedside, Pt is drowsy. He answers questions appropriately but needs to be aroused constantly. Unable to obtain remainder of HPI due to his current status.

## 2023-01-08 LAB
ALBUMIN SERPL ELPH-MCNC: 3.5 G/DL — SIGNIFICANT CHANGE UP (ref 3.3–5)
ALP SERPL-CCNC: 55 U/L — SIGNIFICANT CHANGE UP (ref 40–120)
ALT FLD-CCNC: 20 U/L — SIGNIFICANT CHANGE UP (ref 10–45)
AMPHET UR-MCNC: NEGATIVE — SIGNIFICANT CHANGE UP
ANION GAP SERPL CALC-SCNC: 11 MMOL/L — SIGNIFICANT CHANGE UP (ref 5–17)
APPEARANCE UR: CLEAR — SIGNIFICANT CHANGE UP
AST SERPL-CCNC: 52 U/L — HIGH (ref 10–40)
BARBITURATES UR SCN-MCNC: NEGATIVE — SIGNIFICANT CHANGE UP
BASOPHILS # BLD AUTO: 0.01 K/UL — SIGNIFICANT CHANGE UP (ref 0–0.2)
BASOPHILS NFR BLD AUTO: 0.3 % — SIGNIFICANT CHANGE UP (ref 0–2)
BENZODIAZ UR-MCNC: NEGATIVE — SIGNIFICANT CHANGE UP
BILIRUB SERPL-MCNC: 0.3 MG/DL — SIGNIFICANT CHANGE UP (ref 0.2–1.2)
BILIRUB UR-MCNC: NEGATIVE — SIGNIFICANT CHANGE UP
BLD GP AB SCN SERPL QL: NEGATIVE — SIGNIFICANT CHANGE UP
BUN SERPL-MCNC: 15 MG/DL — SIGNIFICANT CHANGE UP (ref 7–23)
CALCIUM SERPL-MCNC: 8.4 MG/DL — SIGNIFICANT CHANGE UP (ref 8.4–10.5)
CHLORIDE SERPL-SCNC: 105 MMOL/L — SIGNIFICANT CHANGE UP (ref 96–108)
CK MB CFR SERPL CALC: 26.6 NG/ML — HIGH (ref 0–6.7)
CK SERPL-CCNC: 1005 U/L — HIGH (ref 30–200)
CK SERPL-CCNC: 1230 U/L — HIGH (ref 30–200)
CO2 SERPL-SCNC: 23 MMOL/L — SIGNIFICANT CHANGE UP (ref 22–31)
COCAINE METAB.OTHER UR-MCNC: NEGATIVE — SIGNIFICANT CHANGE UP
COLOR SPEC: YELLOW — SIGNIFICANT CHANGE UP
CREAT ?TM UR-MCNC: 157 MG/DL — SIGNIFICANT CHANGE UP
CREAT SERPL-MCNC: 1.04 MG/DL — SIGNIFICANT CHANGE UP (ref 0.5–1.3)
DIFF PNL FLD: NEGATIVE — SIGNIFICANT CHANGE UP
EGFR: 73 ML/MIN/1.73M2 — SIGNIFICANT CHANGE UP
EOSINOPHIL # BLD AUTO: 0.08 K/UL — SIGNIFICANT CHANGE UP (ref 0–0.5)
EOSINOPHIL NFR BLD AUTO: 2.7 % — SIGNIFICANT CHANGE UP (ref 0–6)
GLUCOSE SERPL-MCNC: 94 MG/DL — SIGNIFICANT CHANGE UP (ref 70–99)
GLUCOSE UR QL: NEGATIVE — SIGNIFICANT CHANGE UP
HCT VFR BLD CALC: 37.3 % — LOW (ref 39–50)
HGB BLD-MCNC: 12.5 G/DL — LOW (ref 13–17)
IMM GRANULOCYTES NFR BLD AUTO: 0.3 % — SIGNIFICANT CHANGE UP (ref 0–0.9)
KETONES UR-MCNC: ABNORMAL MG/DL
LEUKOCYTE ESTERASE UR-ACNC: NEGATIVE — SIGNIFICANT CHANGE UP
LYMPHOCYTES # BLD AUTO: 0.93 K/UL — LOW (ref 1–3.3)
LYMPHOCYTES # BLD AUTO: 31.1 % — SIGNIFICANT CHANGE UP (ref 13–44)
MAGNESIUM SERPL-MCNC: 2.1 MG/DL — SIGNIFICANT CHANGE UP (ref 1.6–2.6)
MCHC RBC-ENTMCNC: 30.5 PG — SIGNIFICANT CHANGE UP (ref 27–34)
MCHC RBC-ENTMCNC: 33.5 GM/DL — SIGNIFICANT CHANGE UP (ref 32–36)
MCV RBC AUTO: 91 FL — SIGNIFICANT CHANGE UP (ref 80–100)
METHADONE UR-MCNC: NEGATIVE — SIGNIFICANT CHANGE UP
MONOCYTES # BLD AUTO: 0.38 K/UL — SIGNIFICANT CHANGE UP (ref 0–0.9)
MONOCYTES NFR BLD AUTO: 12.7 % — SIGNIFICANT CHANGE UP (ref 2–14)
NEUTROPHILS # BLD AUTO: 1.58 K/UL — LOW (ref 1.8–7.4)
NEUTROPHILS NFR BLD AUTO: 52.9 % — SIGNIFICANT CHANGE UP (ref 43–77)
NITRITE UR-MCNC: NEGATIVE — SIGNIFICANT CHANGE UP
NRBC # BLD: 0 /100 WBCS — SIGNIFICANT CHANGE UP (ref 0–0)
OPIATES UR-MCNC: NEGATIVE — SIGNIFICANT CHANGE UP
PCP SPEC-MCNC: SIGNIFICANT CHANGE UP
PCP UR-MCNC: NEGATIVE — SIGNIFICANT CHANGE UP
PH UR: 5.5 — SIGNIFICANT CHANGE UP (ref 5–8)
PHOSPHATE SERPL-MCNC: 3.3 MG/DL — SIGNIFICANT CHANGE UP (ref 2.5–4.5)
PLATELET # BLD AUTO: 104 K/UL — LOW (ref 150–400)
POTASSIUM SERPL-MCNC: 3.6 MMOL/L — SIGNIFICANT CHANGE UP (ref 3.5–5.3)
POTASSIUM SERPL-SCNC: 3.6 MMOL/L — SIGNIFICANT CHANGE UP (ref 3.5–5.3)
PROT SERPL-MCNC: 5.7 G/DL — LOW (ref 6–8.3)
PROT UR-MCNC: NEGATIVE MG/DL — SIGNIFICANT CHANGE UP
RBC # BLD: 4.1 M/UL — LOW (ref 4.2–5.8)
RBC # FLD: 12.3 % — SIGNIFICANT CHANGE UP (ref 10.3–14.5)
RH IG SCN BLD-IMP: POSITIVE — SIGNIFICANT CHANGE UP
SODIUM SERPL-SCNC: 139 MMOL/L — SIGNIFICANT CHANGE UP (ref 135–145)
SODIUM UR-SCNC: 99 MMOL/L — SIGNIFICANT CHANGE UP
SP GR SPEC: >=1.03 — SIGNIFICANT CHANGE UP (ref 1–1.03)
THC UR QL: NEGATIVE — SIGNIFICANT CHANGE UP
TSH SERPL-MCNC: 0.6 UIU/ML — SIGNIFICANT CHANGE UP (ref 0.27–4.2)
UROBILINOGEN FLD QL: 0.2 E.U./DL — SIGNIFICANT CHANGE UP
WBC # BLD: 2.99 K/UL — LOW (ref 3.8–10.5)
WBC # FLD AUTO: 2.99 K/UL — LOW (ref 3.8–10.5)

## 2023-01-08 PROCEDURE — 99232 SBSQ HOSP IP/OBS MODERATE 35: CPT | Mod: GC

## 2023-01-08 RX ORDER — FLUTICASONE PROPIONATE AND SALMETEROL 50; 250 UG/1; UG/1
1 POWDER ORAL; RESPIRATORY (INHALATION)
Qty: 0 | Refills: 0 | DISCHARGE

## 2023-01-08 RX ORDER — RISPERIDONE 4 MG/1
6 TABLET ORAL EVERY 24 HOURS
Refills: 0 | Status: DISCONTINUED | OUTPATIENT
Start: 2023-01-08 | End: 2023-01-10

## 2023-01-08 RX ORDER — FLUOXETINE HCL 10 MG
20 CAPSULE ORAL
Refills: 0 | Status: DISCONTINUED | OUTPATIENT
Start: 2023-01-08 | End: 2023-01-10

## 2023-01-08 RX ORDER — ALBUTEROL 90 UG/1
2 AEROSOL, METERED ORAL
Qty: 0 | Refills: 0 | DISCHARGE

## 2023-01-08 RX ORDER — DOCUSATE SODIUM 100 MG
1 CAPSULE ORAL
Qty: 0 | Refills: 0 | DISCHARGE

## 2023-01-08 RX ORDER — BENZTROPINE MESYLATE 1 MG
0.5 TABLET ORAL EVERY 12 HOURS
Refills: 0 | Status: DISCONTINUED | OUTPATIENT
Start: 2023-01-08 | End: 2023-01-10

## 2023-01-08 RX ORDER — SENNA PLUS 8.6 MG/1
1 TABLET ORAL AT BEDTIME
Refills: 0 | Status: DISCONTINUED | OUTPATIENT
Start: 2023-01-08 | End: 2023-01-10

## 2023-01-08 RX ORDER — SENNA PLUS 8.6 MG/1
1 TABLET ORAL
Qty: 0 | Refills: 0 | DISCHARGE

## 2023-01-08 RX ADMIN — BUDESONIDE AND FORMOTEROL FUMARATE DIHYDRATE 2 PUFF(S): 160; 4.5 AEROSOL RESPIRATORY (INHALATION) at 07:39

## 2023-01-08 RX ADMIN — SENNA PLUS 1 TABLET(S): 8.6 TABLET ORAL at 22:10

## 2023-01-08 RX ADMIN — Medication 0.5 MILLIGRAM(S): at 17:40

## 2023-01-08 RX ADMIN — Medication 20 MILLIGRAM(S): at 06:21

## 2023-01-08 RX ADMIN — BUPROPION HYDROCHLORIDE 150 MILLIGRAM(S): 150 TABLET, EXTENDED RELEASE ORAL at 09:29

## 2023-01-08 RX ADMIN — TAMSULOSIN HYDROCHLORIDE 0.4 MILLIGRAM(S): 0.4 CAPSULE ORAL at 22:10

## 2023-01-08 RX ADMIN — Medication 20 MILLIGRAM(S): at 17:40

## 2023-01-08 RX ADMIN — BUDESONIDE AND FORMOTEROL FUMARATE DIHYDRATE 2 PUFF(S): 160; 4.5 AEROSOL RESPIRATORY (INHALATION) at 17:41

## 2023-01-08 RX ADMIN — ATORVASTATIN CALCIUM 20 MILLIGRAM(S): 80 TABLET, FILM COATED ORAL at 22:10

## 2023-01-08 RX ADMIN — RISPERIDONE 6 MILLIGRAM(S): 4 TABLET ORAL at 17:40

## 2023-01-08 RX ADMIN — Medication 100 MICROGRAM(S): at 06:21

## 2023-01-08 NOTE — PHYSICAL THERAPY INITIAL EVALUATION ADULT - ADDITIONAL COMMENTS
Patient states he lives in a psychiatric facility with elevator access. He has his own room. Independent PTA. Reports the one fall PTA but denies any other falls in the last 6 months

## 2023-01-08 NOTE — PHYSICAL THERAPY INITIAL EVALUATION ADULT - NAME OF DISCHARGE PLANNER
Problem: Safety  Goal: Will remain free from falls    Intervention: Assess risk factors for falls  Fall measures in place. Bed alarm is on, call light within reach, personal belongings close-by, bed in lowest position, IV pole on same side of bathroom, upper bed rails up, hourly rounding in place. Will continue to monitor pt for safety.       Problem: Infection  Goal: Will remain free from infection    Intervention: Assess signs and symptoms of infection  Pt on IV abx for UTI, edu on need for abx, hydration, and hand washing.          Teo CM

## 2023-01-08 NOTE — PHYSICAL THERAPY INITIAL EVALUATION ADULT - PERTINENT HX OF CURRENT PROBLEM, REHAB EVAL
77M PHMx hypothyroidism, schizoaffective disorder, asthma, HLD, BPH presents from [facility] facility for fall after being found down for unknown period of time by staff.  CTH neg for acute injury. CT cervical spine neg for fracture

## 2023-01-08 NOTE — PROGRESS NOTE ADULT - PROBLEM SELECTOR PLAN 3
Per surescripts, patient has picked up benzatropine 0.5mg, risperidone 2mg, buproprion 150mg, fluoxetine 20mg however patient unsure dose or frequency. Unable to reach [facility] facility    Plan:  - Formal med rec  - c/w buproprion 150mg QD and fluoxetine 20mg BID  - Restart meds as appropriate, holding benzatropin and risperidone. Concern for possible medication induced fall Per surescripts, patient has picked up benzatropine 0.5mg, risperidone 2mg, buproprion 150mg, fluoxetine 20mg however patient unsure dose or frequency. Unable to reach [facility]    Plan:  - Formal med rec  - c/w buproprion 150mg QD and fluoxetine 20mg BID  - can restart risperidone and benztropine

## 2023-01-08 NOTE — PROGRESS NOTE ADULT - SUBJECTIVE AND OBJECTIVE BOX
Patient is a 77y old  Male who presents with a chief complaint of fall, covid (07 Jan 2023 21:54)        SUBJECTIVE:  Patient was seen and examined at bedside.    Overnight Events :       Review of systems: 12 point Review of systems negative unless otherwise documented elsewhere in note.     Diet, Regular (01-07-23 @ 22:31) [Active]      MEDICATIONS:  MEDICATIONS  (STANDING):  atorvastatin 20 milliGRAM(s) Oral at bedtime  benztropine 0.5 milliGRAM(s) Oral every 12 hours  budesonide 160 MICROgram(s)/formoterol 4.5 MICROgram(s) Inhaler 2 Puff(s) Inhalation two times a day  buPROPion XL (24-Hour) . 150 milliGRAM(s) Oral daily  FLUoxetine 20 milliGRAM(s) Oral two times a day  levothyroxine 100 MICROGram(s) Oral daily  risperiDONE   Tablet 6 milliGRAM(s) Oral every 24 hours  senna 1 Tablet(s) Oral at bedtime  tamsulosin 0.4 milliGRAM(s) Oral at bedtime    MEDICATIONS  (PRN):  albuterol    90 MICROgram(s) HFA Inhaler 1 Puff(s) Inhalation every 4 hours PRN Shortness of Breath and/or Wheezing      Allergies    No Known Allergies    Intolerances        OBJECTIVE:  Vital Signs Last 24 Hrs  T(C): 37.2 (08 Jan 2023 09:44), Max: 37.2 (08 Jan 2023 09:44)  T(F): 98.9 (08 Jan 2023 09:44), Max: 98.9 (08 Jan 2023 09:44)  HR: 66 (08 Jan 2023 15:19) (64 - 68)  BP: 116/56 (08 Jan 2023 15:19) (116/56 - 165/79)  BP(mean): --  RR: 17 (08 Jan 2023 09:44) (17 - 18)  SpO2: 100% (08 Jan 2023 15:19) (96% - 100%)    Parameters below as of 08 Jan 2023 15:19  Patient On (Oxygen Delivery Method): room air      I&O's Summary    08 Jan 2023 07:01  -  08 Jan 2023 17:28  --------------------------------------------------------  IN: 0 mL / OUT: 500 mL / NET: -500 mL        PHYSICAL EXAM:  Gen: Resting in bed at time of exam, not in distress   HEENT: moist mucosa, no lesions   Neck: supple, trachea at midline  CV: RRR, +S1/S2  Pulm: no wheezing , no crackles  no increase in work of breathing  Abd: soft, NTND  Skin: warm and dry, no new rashes   Ext: moving all 4 extremities spontaneously , no edema  ,  Neuro: AOx3, no gross focal neurological deficits  Psych: affect and behavior appropriate, pleasant at time of interview    LABS:                        12.5   2.99  )-----------( 104      ( 08 Jan 2023 06:20 )             37.3     01-08    139  |  105  |  15  ----------------------------<  94  3.6   |  23  |  1.04    Ca    8.4      08 Jan 2023 06:20  Phos  3.3     01-08  Mg     2.1     01-08    TPro  5.7<L>  /  Alb  3.5  /  TBili  0.3  /  DBili  x   /  AST  52<H>  /  ALT  20  /  AlkPhos  55  01-08    LIVER FUNCTIONS - ( 08 Jan 2023 06:20 )  Alb: 3.5 g/dL / Pro: 5.7 g/dL / ALK PHOS: 55 U/L / ALT: 20 U/L / AST: 52 U/L / GGT: x             CAPILLARY BLOOD GLUCOSE        Urinalysis Basic - ( 08 Jan 2023 00:06 )    Color: Yellow / Appearance: Clear / SG: >=1.030 / pH: x  Gluc: x / Ketone: Trace mg/dL  / Bili: Negative / Urobili: 0.2 E.U./dL   Blood: x / Protein: NEGATIVE mg/dL / Nitrite: NEGATIVE   Leuk Esterase: NEGATIVE / RBC: x / WBC x   Sq Epi: x / Non Sq Epi: x / Bacteria: x        MICRODATA:      RADIOLOGY/OTHER STUDIES:             Patient is a 77y old  Male who presents with a chief complaint of fall, covid (07 Jan 2023 21:54)        SUBJECTIVE:  Patient was seen and examined at bedside.    Overnight Events : fall , unable to clarify if he lost consciousness or not       Review of systems: 12 point Review of systems negative unless otherwise documented elsewhere in note.     Diet, Regular (01-07-23 @ 22:31) [Active]      MEDICATIONS:  MEDICATIONS  (STANDING):  atorvastatin 20 milliGRAM(s) Oral at bedtime  benztropine 0.5 milliGRAM(s) Oral every 12 hours  budesonide 160 MICROgram(s)/formoterol 4.5 MICROgram(s) Inhaler 2 Puff(s) Inhalation two times a day  buPROPion XL (24-Hour) . 150 milliGRAM(s) Oral daily  FLUoxetine 20 milliGRAM(s) Oral two times a day  levothyroxine 100 MICROGram(s) Oral daily  risperiDONE   Tablet 6 milliGRAM(s) Oral every 24 hours  senna 1 Tablet(s) Oral at bedtime  tamsulosin 0.4 milliGRAM(s) Oral at bedtime    MEDICATIONS  (PRN):  albuterol    90 MICROgram(s) HFA Inhaler 1 Puff(s) Inhalation every 4 hours PRN Shortness of Breath and/or Wheezing      Allergies    No Known Allergies    Intolerances        OBJECTIVE:  Vital Signs Last 24 Hrs  T(C): 37.2 (08 Jan 2023 09:44), Max: 37.2 (08 Jan 2023 09:44)  T(F): 98.9 (08 Jan 2023 09:44), Max: 98.9 (08 Jan 2023 09:44)  HR: 66 (08 Jan 2023 15:19) (64 - 68)  BP: 116/56 (08 Jan 2023 15:19) (116/56 - 165/79)  BP(mean): --  RR: 17 (08 Jan 2023 09:44) (17 - 18)  SpO2: 100% (08 Jan 2023 15:19) (96% - 100%)    Parameters below as of 08 Jan 2023 15:19  Patient On (Oxygen Delivery Method): room air      I&O's Summary    08 Jan 2023 07:01  -  08 Jan 2023 17:28  --------------------------------------------------------  IN: 0 mL / OUT: 500 mL / NET: -500 mL        PHYSICAL EXAM:  Gen: Resting in bed at time of exam, not in distress   HEENT: moist mucosa, no lesions   Neck: supple, trachea at midline  CV: RRR, +S1/S2  Pulm: no wheezing , no crackles  no increase in work of breathing  Abd: soft, NTND  Skin: warm and dry, no new rashes   Ext: moving all 4 extremities spontaneously , no edema  ,  Neuro: AOx3, no gross focal neurological deficits  Psych: affect and behavior appropriate, pleasant at time of interview    LABS:                        12.5   2.99  )-----------( 104      ( 08 Jan 2023 06:20 )             37.3     01-08    139  |  105  |  15  ----------------------------<  94  3.6   |  23  |  1.04    Ca    8.4      08 Jan 2023 06:20  Phos  3.3     01-08  Mg     2.1     01-08    TPro  5.7<L>  /  Alb  3.5  /  TBili  0.3  /  DBili  x   /  AST  52<H>  /  ALT  20  /  AlkPhos  55  01-08    LIVER FUNCTIONS - ( 08 Jan 2023 06:20 )  Alb: 3.5 g/dL / Pro: 5.7 g/dL / ALK PHOS: 55 U/L / ALT: 20 U/L / AST: 52 U/L / GGT: x             CAPILLARY BLOOD GLUCOSE        Urinalysis Basic - ( 08 Jan 2023 00:06 )    Color: Yellow / Appearance: Clear / SG: >=1.030 / pH: x  Gluc: x / Ketone: Trace mg/dL  / Bili: Negative / Urobili: 0.2 E.U./dL   Blood: x / Protein: NEGATIVE mg/dL / Nitrite: NEGATIVE   Leuk Esterase: NEGATIVE / RBC: x / WBC x   Sq Epi: x / Non Sq Epi: x / Bacteria: x        MICRODATA:      RADIOLOGY/OTHER STUDIES:

## 2023-01-08 NOTE — PROGRESS NOTE ADULT - ASSESSMENT
77M PHMx hypothyroidism, schizoaffective disorder, asthma, HLD, BPH presents from [facility] facility for fall after being found down for unknown period of time by staff now admitted for fall work up and COVID management.

## 2023-01-08 NOTE — PROGRESS NOTE ADULT - PROBLEM SELECTOR PLAN 1
Found down by [facility] staff for unknown period of time. Patient reports no recall of fall but does endorse having intermittent dizziness upon position change (standing from seated position). CTH and CT cervical without acute pathology. EKG with RBBB with 1st degree block. Likely 2/2 dehydration/orthostatic hypotension vs medication sedation vs cardiac etiology    Plan:  - f/u orthostatics  - f/u TTE  - f/u UA and utox  - f/u CK in AM  - Fall precaution

## 2023-01-09 ENCOUNTER — TRANSCRIPTION ENCOUNTER (OUTPATIENT)
Age: 78
End: 2023-01-09

## 2023-01-09 LAB
ALBUMIN SERPL ELPH-MCNC: 3.7 G/DL — SIGNIFICANT CHANGE UP (ref 3.3–5)
ALP SERPL-CCNC: 61 U/L — SIGNIFICANT CHANGE UP (ref 40–120)
ALT FLD-CCNC: 23 U/L — SIGNIFICANT CHANGE UP (ref 10–45)
ANION GAP SERPL CALC-SCNC: 9 MMOL/L — SIGNIFICANT CHANGE UP (ref 5–17)
AST SERPL-CCNC: 45 U/L — HIGH (ref 10–40)
BASOPHILS # BLD AUTO: 0.01 K/UL — SIGNIFICANT CHANGE UP (ref 0–0.2)
BASOPHILS NFR BLD AUTO: 0.2 % — SIGNIFICANT CHANGE UP (ref 0–2)
BILIRUB SERPL-MCNC: 0.2 MG/DL — SIGNIFICANT CHANGE UP (ref 0.2–1.2)
BUN SERPL-MCNC: 14 MG/DL — SIGNIFICANT CHANGE UP (ref 7–23)
CALCIUM SERPL-MCNC: 8.8 MG/DL — SIGNIFICANT CHANGE UP (ref 8.4–10.5)
CHLORIDE SERPL-SCNC: 107 MMOL/L — SIGNIFICANT CHANGE UP (ref 96–108)
CO2 SERPL-SCNC: 27 MMOL/L — SIGNIFICANT CHANGE UP (ref 22–31)
CREAT SERPL-MCNC: 1.2 MG/DL — SIGNIFICANT CHANGE UP (ref 0.5–1.3)
CULTURE RESULTS: SIGNIFICANT CHANGE UP
EGFR: 62 ML/MIN/1.73M2 — SIGNIFICANT CHANGE UP
EOSINOPHIL # BLD AUTO: 0.16 K/UL — SIGNIFICANT CHANGE UP (ref 0–0.5)
EOSINOPHIL NFR BLD AUTO: 3.9 % — SIGNIFICANT CHANGE UP (ref 0–6)
GLUCOSE SERPL-MCNC: 104 MG/DL — HIGH (ref 70–99)
HCT VFR BLD CALC: 38.3 % — LOW (ref 39–50)
HGB BLD-MCNC: 12.6 G/DL — LOW (ref 13–17)
IMM GRANULOCYTES NFR BLD AUTO: 0.5 % — SIGNIFICANT CHANGE UP (ref 0–0.9)
LYMPHOCYTES # BLD AUTO: 1.35 K/UL — SIGNIFICANT CHANGE UP (ref 1–3.3)
LYMPHOCYTES # BLD AUTO: 33.1 % — SIGNIFICANT CHANGE UP (ref 13–44)
MAGNESIUM SERPL-MCNC: 2.2 MG/DL — SIGNIFICANT CHANGE UP (ref 1.6–2.6)
MCHC RBC-ENTMCNC: 30.4 PG — SIGNIFICANT CHANGE UP (ref 27–34)
MCHC RBC-ENTMCNC: 32.9 GM/DL — SIGNIFICANT CHANGE UP (ref 32–36)
MCV RBC AUTO: 92.3 FL — SIGNIFICANT CHANGE UP (ref 80–100)
MONOCYTES # BLD AUTO: 0.39 K/UL — SIGNIFICANT CHANGE UP (ref 0–0.9)
MONOCYTES NFR BLD AUTO: 9.6 % — SIGNIFICANT CHANGE UP (ref 2–14)
NEUTROPHILS # BLD AUTO: 2.15 K/UL — SIGNIFICANT CHANGE UP (ref 1.8–7.4)
NEUTROPHILS NFR BLD AUTO: 52.7 % — SIGNIFICANT CHANGE UP (ref 43–77)
NRBC # BLD: 0 /100 WBCS — SIGNIFICANT CHANGE UP (ref 0–0)
PHOSPHATE SERPL-MCNC: 3.5 MG/DL — SIGNIFICANT CHANGE UP (ref 2.5–4.5)
PLATELET # BLD AUTO: 110 K/UL — LOW (ref 150–400)
POTASSIUM SERPL-MCNC: 4.5 MMOL/L — SIGNIFICANT CHANGE UP (ref 3.5–5.3)
POTASSIUM SERPL-SCNC: 4.5 MMOL/L — SIGNIFICANT CHANGE UP (ref 3.5–5.3)
PROT SERPL-MCNC: 5.8 G/DL — LOW (ref 6–8.3)
RBC # BLD: 4.15 M/UL — LOW (ref 4.2–5.8)
RBC # FLD: 12.1 % — SIGNIFICANT CHANGE UP (ref 10.3–14.5)
SODIUM SERPL-SCNC: 143 MMOL/L — SIGNIFICANT CHANGE UP (ref 135–145)
SPECIMEN SOURCE: SIGNIFICANT CHANGE UP
WBC # BLD: 4.08 K/UL — SIGNIFICANT CHANGE UP (ref 3.8–10.5)
WBC # FLD AUTO: 4.08 K/UL — SIGNIFICANT CHANGE UP (ref 3.8–10.5)

## 2023-01-09 PROCEDURE — 99233 SBSQ HOSP IP/OBS HIGH 50: CPT

## 2023-01-09 PROCEDURE — 93308 TTE F-UP OR LMTD: CPT | Mod: 26

## 2023-01-09 RX ORDER — LEVOTHYROXINE SODIUM 125 MCG
1 TABLET ORAL
Qty: 0 | Refills: 0 | DISCHARGE

## 2023-01-09 RX ORDER — RISPERIDONE 4 MG/1
2 TABLET ORAL
Qty: 0 | Refills: 0 | DISCHARGE

## 2023-01-09 RX ORDER — TAMSULOSIN HYDROCHLORIDE 0.4 MG/1
1 CAPSULE ORAL
Qty: 0 | Refills: 0 | DISCHARGE

## 2023-01-09 RX ORDER — BUPROPION HYDROCHLORIDE 150 MG/1
1 TABLET, EXTENDED RELEASE ORAL
Qty: 0 | Refills: 0 | DISCHARGE
Start: 2023-01-09

## 2023-01-09 RX ORDER — LEVOTHYROXINE SODIUM 125 MCG
1 TABLET ORAL
Qty: 0 | Refills: 0 | DISCHARGE
Start: 2023-01-09

## 2023-01-09 RX ORDER — ATORVASTATIN CALCIUM 80 MG/1
1 TABLET, FILM COATED ORAL
Qty: 0 | Refills: 0 | DISCHARGE
Start: 2023-01-09

## 2023-01-09 RX ORDER — TAMSULOSIN HYDROCHLORIDE 0.4 MG/1
1 CAPSULE ORAL
Qty: 0 | Refills: 0 | DISCHARGE
Start: 2023-01-09

## 2023-01-09 RX ORDER — BUPROPION HYDROCHLORIDE 150 MG/1
1 TABLET, EXTENDED RELEASE ORAL
Qty: 0 | Refills: 0 | DISCHARGE

## 2023-01-09 RX ORDER — FLUOXETINE HCL 10 MG
1 CAPSULE ORAL
Qty: 0 | Refills: 0 | DISCHARGE
Start: 2023-01-09

## 2023-01-09 RX ORDER — BENZTROPINE MESYLATE 1 MG
1 TABLET ORAL
Qty: 0 | Refills: 0 | DISCHARGE

## 2023-01-09 RX ORDER — BENZTROPINE MESYLATE 1 MG
1 TABLET ORAL
Qty: 0 | Refills: 0 | DISCHARGE
Start: 2023-01-09

## 2023-01-09 RX ORDER — ATORVASTATIN CALCIUM 80 MG/1
1 TABLET, FILM COATED ORAL
Qty: 0 | Refills: 0 | DISCHARGE

## 2023-01-09 RX ORDER — RISPERIDONE 4 MG/1
2 TABLET ORAL
Qty: 0 | Refills: 0 | DISCHARGE
Start: 2023-01-09

## 2023-01-09 RX ORDER — FLUOXETINE HCL 10 MG
1 CAPSULE ORAL
Qty: 0 | Refills: 0 | DISCHARGE

## 2023-01-09 RX ADMIN — SENNA PLUS 1 TABLET(S): 8.6 TABLET ORAL at 21:06

## 2023-01-09 RX ADMIN — BUDESONIDE AND FORMOTEROL FUMARATE DIHYDRATE 2 PUFF(S): 160; 4.5 AEROSOL RESPIRATORY (INHALATION) at 19:47

## 2023-01-09 RX ADMIN — BUDESONIDE AND FORMOTEROL FUMARATE DIHYDRATE 2 PUFF(S): 160; 4.5 AEROSOL RESPIRATORY (INHALATION) at 09:15

## 2023-01-09 RX ADMIN — RISPERIDONE 6 MILLIGRAM(S): 4 TABLET ORAL at 17:46

## 2023-01-09 RX ADMIN — Medication 20 MILLIGRAM(S): at 05:36

## 2023-01-09 RX ADMIN — Medication 100 MICROGRAM(S): at 05:36

## 2023-01-09 RX ADMIN — Medication 0.5 MILLIGRAM(S): at 05:36

## 2023-01-09 RX ADMIN — Medication 20 MILLIGRAM(S): at 17:47

## 2023-01-09 RX ADMIN — BUPROPION HYDROCHLORIDE 150 MILLIGRAM(S): 150 TABLET, EXTENDED RELEASE ORAL at 11:25

## 2023-01-09 RX ADMIN — TAMSULOSIN HYDROCHLORIDE 0.4 MILLIGRAM(S): 0.4 CAPSULE ORAL at 21:06

## 2023-01-09 RX ADMIN — Medication 0.5 MILLIGRAM(S): at 17:46

## 2023-01-09 RX ADMIN — ATORVASTATIN CALCIUM 20 MILLIGRAM(S): 80 TABLET, FILM COATED ORAL at 21:06

## 2023-01-09 NOTE — DISCHARGE NOTE PROVIDER - ATTENDING DISCHARGE PHYSICAL EXAMINATION:
77M with PMH of schizoaffective disorder, asthma, hypothyroidism, HLD and BPH presenting from assisted living facility after being found after a fall, and admitted for further workup, as well as being diagnosed with COVID-19.     Physical exam  General: no acute distress, sitting up in bed, alert to self, month, year, and knows he is in the hospital, follows commands  HEENT: normocephalic, atraumatic, MMM  Cards: RRR, normal S1/S2, no murmurs, rubs or gallops  Pulm: CTAB, no wheeze, crackles, rales or rhonchi  Ab: soft, nontender, nondistended, normoactive bowel sounds  Neuro: moves all extremities, no facial asymmetry, speech is fluent, follows commands, no focal/acute deficits noted  Ext: wwp, no edema, erythema, calf asymmetry or tenderness  Skin: warm and dry, no obvious rashes or lesions present    - echo within normal limits  - continues to have no symptoms of covid  - patient is passing flatus with benign abdominal exam, and has good appetite.  Continue on senna   - planning for discharge to [place] today, where patient can continue to quarantine in his own apartment  - should have follow up with outpatient providers for post-discharge follow up

## 2023-01-09 NOTE — DISCHARGE NOTE PROVIDER - NSDCMRMEDTOKEN_GEN_ALL_CORE_FT
Advair Diskus 250 mcg-50 mcg inhalation powder: 1 puff(s) inhaled 2 times a day  Albuterol (Eqv-ProAir HFA) 90 mcg/inh inhalation aerosol: 2 puff(s) inhaled every 6 hours, As Needed  atorvastatin 20 mg oral tablet: 1 tab(s) orally once a day (at bedtime)  benztropine 0.5 mg oral tablet: 1 tab(s) orally every 12 hours  buPROPion 150 mg/24 hours (XL) oral tablet, extended release: 1 tab(s) orally once a day  Colace 100 mg oral capsule: 1 cap(s) orally 2 times a day  FLUoxetine 20 mg oral capsule: 1 cap(s) orally 2 times a day  levothyroxine 100 mcg (0.1 mg) oral tablet: 1 tab(s) orally once a day  risperiDONE 3 mg oral tablet: 2 tab(s) orally every 24 hours  Senna 8.6 mg oral tablet: 1 tab(s) orally once a day (at bedtime)  tamsulosin 0.4 mg oral capsule: 1 cap(s) orally once a day (at bedtime)

## 2023-01-09 NOTE — PROGRESS NOTE ADULT - PROBLEM SELECTOR PLAN 1
Found down by [facility] staff for unknown period of time. Patient reports no recall of fall but does endorse having intermittent dizziness upon position change (standing from seated position). CTH and CT cervical without acute pathology. EKG with RBBB with 1st degree block. Likely 2/2 dehydration/orthostatic hypotension vs medication sedation vs cardiac etiology    Plan:  - f/u orthostatics  - f/u TTE  - f/u UA and utox  - f/u CK in AM  - Fall precaution Found down by [facility] staff for unknown period of time. Patient reports no recall of fall but does endorse having intermittent dizziness upon position change (standing from seated position). CTH and CT cervical without acute pathology. EKG with RBBB with 1st degree block. Likely 2/2 dehydration/orthostatic hypotension vs medication sedation vs cardiac etiology    Plan:  - orthostatics neg  - UA neg  - CK peaked at 1230 on 1/8; now downtrending, most recent CK 1005  - f/u TTE  - Fall precautions  - PT rec'd home, no needs

## 2023-01-09 NOTE — PROGRESS NOTE ADULT - PROBLEM SELECTOR PLAN 5
On synthroid 100mcg QD. Unknown recent TSH level however no signs of myxedema coma such as hypothermia, respiratory depression, edema    Plan:  - c/w synthroid 100mcg QD  - f/u TSH On synthroid 100mcg QD. Unknown recent TSH level however no signs of myxedema coma such as hypothermia, respiratory depression, edema    Plan:  - c/w synthroid 100mcg QD  - TSH 0.596

## 2023-01-09 NOTE — PROGRESS NOTE ADULT - PROBLEM SELECTOR PLAN 4
Cr 1.50 on admission, unknown baseline. Possibly 2/2 pre-renal etiology in setting of fall and decreased PO intake    Plan:  - Monitor Cr  - Avoid nephrotoxic meds and renally dose meds  - f/u urine lytes Cr 1.50 on admission, unknown baseline. Possibly 2/2 pre-renal etiology in setting of fall and decreased PO intake    Plan:  - improving, Cr 1.20 today  - Monitor Cr  - Avoid nephrotoxic meds and renally dose meds

## 2023-01-09 NOTE — DISCHARGE NOTE PROVIDER - NSDCCPCAREPLAN_GEN_ALL_CORE_FT
PRINCIPAL DISCHARGE DIAGNOSIS  Diagnosis: Fall  Assessment and Plan of Treatment: Health conditions which change your blood pressure, vision, muscle strength and coordination may increase your risk for falls. Medication can also increase your risk if they make you dizzy, weak, or sleepy. To prevent falls, you can stand or sit up slowly, use assistive devices as directed, wear shoes that fit well and have soles that , wear a personal alarm, stay active, and manage your medical conditions. Home safety tips include keeping your path clear, removing small rugs, not walking on wet surfaces, installing bright lights at home, and keeping items you use often on shelves within reach.   You were brought to the hospital for a fall after you were found on the ground for an unknown period of time. Your fall workup was negative, and you have had no symptoms while you were in the hospital. Please follow up with your primary care doctor in 1 week for further management.      SECONDARY DISCHARGE DIAGNOSES  Diagnosis: Lethargy  Assessment and Plan of Treatment:      PRINCIPAL DISCHARGE DIAGNOSIS  Diagnosis: Fall  Assessment and Plan of Treatment: Health conditions which change your blood pressure, vision, muscle strength and coordination may increase your risk for falls. Medication can also increase your risk if they make you dizzy, weak, or sleepy. To prevent falls, you can stand or sit up slowly, use assistive devices as directed, wear shoes that fit well and have soles that , wear a personal alarm, stay active, and manage your medical conditions. Home safety tips include keeping your path clear, removing small rugs, not walking on wet surfaces, installing bright lights at home, and keeping items you use often on shelves within reach.   You were brought to the hospital for a fall after you were found on the ground for an unknown period of time. Your fall workup was negative, and you have had no symptoms while you were in the hospital. Your echocardiogram, which is an ultrasound of your heart, demonstrated normal pumping function of your heart. Please follow up with your primary care doctor in 1 week for further management.      SECONDARY DISCHARGE DIAGNOSES  Diagnosis: Lethargy  Assessment and Plan of Treatment:

## 2023-01-09 NOTE — DISCHARGE NOTE PROVIDER - CARE PROVIDER_API CALL
Hospital Medicine History & Physical      PCP: [id number]    Date of Admission: 9/5/2022    Date of Service: Pt seen/examined on 9/6/2022      Chief Complaint:    Chief Complaint   Patient presents with    Abdominal Pain     C/o upper mid abd pain, bloating since this afternoon. History Of Present Illness: The patient is a 45 y.o. female with GERD who presented to [facility] ED with complaint of abdominal pain. Onset was abrupt and started yesterday approximately 1400. Located in epigastric and RUQ. Endorses pressure in her abdomen and states she gets SOB and experiences back pain when she takes a deep breath. She also has associated nausea without vomiting. She is still having Bms and passing flatus. She denies fevers, chills, cough, chest pain, palpitations, diarrhea, numbness, tingling or swelling in her extremities. BP initially elevated, improved. Remainder of vitals are stable. Labs remarkable for mild leukocytosis today 11.2, initially 6.7. She has elevated alk phos, bilirubin and transaminitis. CT with possible cholecystitis. She is admitted for further care. Past Medical History:        Diagnosis Date    Reflux gastritis        Past Surgical History:        Procedure Laterality Date    APPENDECTOMY         Medications Prior to Admission:    Prior to Admission medications    Medication Sig Start Date End Date Taking? Authorizing Provider   Cholecalciferol (VITAMIN D) 50 MCG (2000 UT) CAPS capsule Take by mouth daily Unsure of dose   Yes Historical Provider, MD   esomeprazole Magnesium (NEXIUM) 40 MG PACK Take 40 mg by mouth daily    Historical Provider, MD   cetirizine (ZYRTEC) 5 MG tablet Take 5 mg by mouth daily    Historical Provider, MD       Allergies:  Decadron [dexamethasone]    Social History:  The patient currently lives at home    TOBACCO:   reports that she has never smoked. She has never used smokeless tobacco.  ETOH:   reports no history of alcohol use.       Family History:   Positive as follows:    History reviewed. No pertinent family history. REVIEW OF SYSTEMS:       Constitutional: Negative for fever   HENT: Negative for sore throat   Eyes: Negative for redness   Respiratory: Negative cough, + SOB 2/2 pain with deep insp  Cardiovascular: Negative for chest pain   Gastrointestinal: Negative for vomiting, diarrhea +abd pain and pressure, + nausea  Genitourinary: Negative for hematuria   Musculoskeletal: Negative for arthralgias   Skin: Negative for rash   Neurological: Negative for syncope   Hematological: Negative for adenopathy   Psychiatric/Behavorial: Negative for anxiety    PHYSICAL EXAM:    /70   Pulse 80   Temp 98.3 °F (36.8 °C) (Oral)   Resp 18   Ht 5' 4\" (1.626 m)   Wt 205 lb 8 oz (93.2 kg)   SpO2 94%   Breastfeeding No   BMI 35.27 kg/m²     Gen: No distress. Alert. Eyes: PERRL. No sclera icterus. No conjunctival injection. ENT: No discharge. Pharynx clear. Neck: No JVD. Trachea midline. Resp: No accessory muscle use. No crackles. No wheezes. No rhonchi. CV: Regular rate. Regular rhythm. No murmur. No rub. No edema. Capillary Refill: Brisk,< 3 seconds   Peripheral Pulses: +2 palpable, equal bilaterally   GI: Mildly TTP in epigastrium and RUQ. Augusto Pankaj sign. Non-distended. No masses. No organomegaly. Hypoactive bowel sounds. No hernia. Skin: Warm and dry. No nodule on exposed extremities. No rash on exposed extremities. M/S: No cyanosis. No joint deformity. No clubbing. Neuro: Awake. Grossly nonfocal    Psych: Oriented x 3. No anxiety or agitation. Ladonna Hewitt MD have reviewed the chart on April A Jaime and personally interviewed and examined patient, reviewed the data (labs and imaging) and after discussion with my PA formulated the plan. Agree with note with the following edits. HPI: 18-year-old female history of GERD presented to the emergency room with abdominal pain several hours duration.   Pain is primarily in the epigastric and right upper quadrant region. Associated with nausea. No vomiting. Denies any fever or chills. CT abdomen with possible acute cholecystitis    I reviewed the patient's Past Medical History, Past Surgical History, Medications, and Allergies. Physical exam:    /70   Pulse 80   Temp 98.3 °F (36.8 °C) (Oral)   Resp 18   Ht 5' 4\" (1.626 m)   Wt 205 lb 8 oz (93.2 kg)   SpO2 94%   Breastfeeding No   BMI 35.27 kg/m²     Gen: No distress. Alert. Eyes: PERRL. No sclera icterus. No conjunctival injection. ENT: No discharge. Pharynx clear. Neck: Trachea midline. Normal thyroid. Resp: No accessory muscle use. No crackles. No wheezes. No rhonchi. No dullness on percussion. CV: Regular rate. Regular rhythm. No murmur or rub. No edema. GI: Non-tender. Non-distended. No masses. No organomegaly. Normal bowel sounds. No hernia. Skin: Warm and dry. No nodule on exposed extremities. No rash on exposed extremities. Lymph: No cervical LAD. No supraclavicular LAD. M/S: No cyanosis. No joint deformity. No clubbing. Neuro: Awake. Moves all 4 extremities, non focal  Psych: Oriented x 3. No anxiety or agitation.            TACO Vinson.     CBC:   Recent Labs     09/05/22 1841 09/06/22  0530   WBC 6.7 11.2*   HGB 13.1 12.2   HCT 38.5 36.4   MCV 83.3 84.3    233     BMP:   Recent Labs     09/05/22 1841 09/06/22  0530    136   K 3.9 3.6    103   CO2 24 25   BUN 11 7   CREATININE 0.6 0.6     LIVER PROFILE:   Recent Labs     09/05/22 1841 09/06/22  0530   * 258*   ALT 88* 211*   LIPASE 26.0  --    BILIDIR  --  0.9*   BILITOT 0.6 1.4*   ALKPHOS 160* 155*       UA:  Recent Labs     09/05/22  1800   COLORU Yellow   PHUR 7.0   CLARITYU Clear   SPECGRAV 1.020   LEUKOCYTESUR Negative   UROBILINOGEN 0.2   BILIRUBINUR Negative   BLOODU Negative   GLUCOSEU Negative          CARDIAC ENZYMES  Recent Labs     09/05/22  1841   TROPONINI <0.01       U/A: Lab Results   Component Value Date/Time    COLORU Yellow 09/05/2022 06:00 PM    CLARITYU Clear 09/05/2022 06:00 PM    SPECGRAV 1.020 09/05/2022 06:00 PM    LEUKOCYTESUR Negative 09/05/2022 06:00 PM    BLOODU Negative 09/05/2022 06:00 PM    GLUCOSEU Negative 09/05/2022 06:00 PM         CULTURES  Covid not detected    EKG:  I have reviewed the EKG with the following interpretation:   NA    RADIOLOGY  CT ABDOMEN PELVIS W IV CONTRAST Additional Contrast? None   Final Result   Mildly distended gallbladder with gallbladder wall thickening. Findings may   reflect cholecystitis. Recommend further evaluation with right upper   quadrant ultrasound. 4.7 cm complex left adnexal cyst.  Recommend further evaluation with pelvic   ultrasound. XR CHEST PORTABLE   Final Result   1. Left basilar airspace disease either due to atelectasis, pleural fluid or   developing pneumonia. MRI ABDOMEN WO CONTRAST MRCP    (Results Pending)        ASSESSMENT/PLAN:  #Acute cholecystitis   -CT above  -MRCP ordered  --If biliary obstruction may need GI consult for ERCP  -NPO, IVF  -Pain control and antiemetics  -Rocephin and flagyl D#2  -General surgery consulted    #Transaminitis  #Hyperbilirubinemia  #Elevated alk phos  #Leukocytosis  -2/2 above    Left adnexal cyst.  Needs outpatient follow-up. Discussed with patient and patient's  at bedside. #GERD  -PPI held with NPO    #Hiatal hernia  -relatively new diagnosis  -being worked up for this OP    DVT Prophylaxis: Lovenox  Diet: Diet NPO  Code Status: Full Code    Ibeth Littlejohn PA-C  9/6/2022 9:12 AM      Agree with above    TY Vinson Milady Decker)  Internal Medicine  [address] [place] [address]  Phone: [phone]  Fax: [phone]  Follow Up Time: 1 week

## 2023-01-09 NOTE — PROGRESS NOTE ADULT - ASSESSMENT
77M PHMx hypothyroidism, schizoaffective disorder, asthma, HLD, BPH presents from [facility] facility for fall after being found down for unknown period of time by staff now admitted for fall work up and COVID management. 77M PHMx hypothyroidism, schizoaffective disorder, asthma, HLD, BPH presents from [facility] facility for fall after being found down for unknown period of time by staff now admitted for fall work up and COVID management.

## 2023-01-09 NOTE — PROGRESS NOTE ADULT - ATTENDING COMMENTS
77M with PMH of schizoaffective disorder, asthma, hypothyroidism, HLD and BPH presenting from assisted living facility after being found after a fall, and admitted for further workup, as well as being diagnosed with COVID-19.     Physical exam  General: no acute distress, sitting up in bed, alert to self, month, year, and knows he is in the hospital, follows commands  HEENT: normocephalic, atraumatic, MMM  Cards: RRR, normal S1/S2, no murmurs, rubs or gallops  Pulm: CTAB, no wheeze, crackles, rales or rhonchi  Ab: soft, nontender, nondistended, normoactive bowel sounds  Neuro: moves all extremities, no facial asymmetry, speech is fluent, follows commands, no focal/acute deficits noted  Ext: wwp, no edema, erythema, calf asymmetry or tenderness  Skin: warm and dry, no obvious rashes or lesions present    - patient is a poor historian, and team with attempt to obtain collateral information from assisted living about baseline mental status.  Does not appear delirious or with any neurological deficits.  Likely some component of waxing and waning encephalopathy from active COVID infection and underlying psychiatric condition  - awaiting echocardiogram for syncope workup  - showing no signs/symptoms of COVID-19, and not started on decadron or remdesivir    >40 minutes spent on this encounter, including face to face with patient, documentation and care coordination

## 2023-01-09 NOTE — PROGRESS NOTE ADULT - SUBJECTIVE AND OBJECTIVE BOX
** INCOMPLETE **    OVERNIGHT EVENTS:     SUBJECTIVE:    VITAL SIGNS:  Vital Signs Last 24 Hrs  T(C): 36.4 (09 Jan 2023 05:43), Max: 37.2 (08 Jan 2023 09:44)  T(F): 97.6 (09 Jan 2023 05:43), Max: 98.9 (08 Jan 2023 09:44)  HR: 60 (09 Jan 2023 05:43) (60 - 68)  BP: 145/71 (09 Jan 2023 05:43) (116/56 - 145/71)  BP(mean): --  RR: 18 (09 Jan 2023 05:43) (17 - 18)  SpO2: 97% (09 Jan 2023 05:43) (96% - 100%)    Parameters below as of 09 Jan 2023 05:43  Patient On (Oxygen Delivery Method): room air        PHYSICAL EXAM:  General: NAD; speaking in full sentences  HEENT: NC/AT; PERRL; EOMI; MMM  Neck: supple; no JVD  Cardiac: RRR; +S1/S2  Pulm: CTA B/L; no W/R/R  GI: soft, NT/ND, +BS  Extremities:  no edema, clubbing or cyanosis  Vasc: 2+ radial, DP pulses B/L  Neuro: AAOx3; no focal deficits    MEDICATIONS:  MEDICATIONS  (STANDING):  atorvastatin 20 milliGRAM(s) Oral at bedtime  benztropine 0.5 milliGRAM(s) Oral every 12 hours  budesonide 160 MICROgram(s)/formoterol 4.5 MICROgram(s) Inhaler 2 Puff(s) Inhalation two times a day  buPROPion XL (24-Hour) . 150 milliGRAM(s) Oral daily  FLUoxetine 20 milliGRAM(s) Oral two times a day  levothyroxine 100 MICROGram(s) Oral daily  risperiDONE   Tablet 6 milliGRAM(s) Oral every 24 hours  senna 1 Tablet(s) Oral at bedtime  tamsulosin 0.4 milliGRAM(s) Oral at bedtime    MEDICATIONS  (PRN):  albuterol    90 MICROgram(s) HFA Inhaler 1 Puff(s) Inhalation every 4 hours PRN Shortness of Breath and/or Wheezing      ALLERGIES:  Allergies    No Known Allergies    Intolerances        LABS:                        12.6   4.08  )-----------( 110      ( 09 Jan 2023 06:06 )             38.3     01-09    143  |  107  |  14  ----------------------------<  104<H>  4.5   |  27  |  1.20    Ca    8.8      09 Jan 2023 06:06  Phos  3.5     01-09  Mg     2.2     01-09    TPro  5.8<L>  /  Alb  3.7  /  TBili  0.2  /  DBili  x   /  AST  45<H>  /  ALT  23  /  AlkPhos  61  01-09        RADIOLOGY & ADDITIONAL TESTS: Reviewed. OVERNIGHT EVENTS:   No acute events overnight.    SUBJECTIVE:  Pt seen and examined at bedside. Reports mild dizziness but otherwise has no complaints. No fevers, chills, CP, dyspnea, abd pain, N/V/D.    VITAL SIGNS:  Vital Signs Last 24 Hrs  T(C): 36.4 (09 Jan 2023 05:43), Max: 37.2 (08 Jan 2023 09:44)  T(F): 97.6 (09 Jan 2023 05:43), Max: 98.9 (08 Jan 2023 09:44)  HR: 60 (09 Jan 2023 05:43) (60 - 68)  BP: 145/71 (09 Jan 2023 05:43) (116/56 - 145/71)  BP(mean): --  RR: 18 (09 Jan 2023 05:43) (17 - 18)  SpO2: 97% (09 Jan 2023 05:43) (96% - 100%)    Parameters below as of 09 Jan 2023 05:43  Patient On (Oxygen Delivery Method): room air        PHYSICAL EXAM:  General: NAD; speaking in full sentences  HEENT: NC/AT; PERRL; EOMI; MMM  Neck: supple; no JVD  Cardiac: RRR; +S1/S2  Pulm: CTA B/L; no W/R/R  GI: soft, NT/ND, +BS  Extremities:  no edema, clubbing or cyanosis  Vasc: 2+ radial, DP pulses B/L  Neuro: AAOx3; no focal deficits    MEDICATIONS:  MEDICATIONS  (STANDING):  atorvastatin 20 milliGRAM(s) Oral at bedtime  benztropine 0.5 milliGRAM(s) Oral every 12 hours  budesonide 160 MICROgram(s)/formoterol 4.5 MICROgram(s) Inhaler 2 Puff(s) Inhalation two times a day  buPROPion XL (24-Hour) . 150 milliGRAM(s) Oral daily  FLUoxetine 20 milliGRAM(s) Oral two times a day  levothyroxine 100 MICROGram(s) Oral daily  risperiDONE   Tablet 6 milliGRAM(s) Oral every 24 hours  senna 1 Tablet(s) Oral at bedtime  tamsulosin 0.4 milliGRAM(s) Oral at bedtime    MEDICATIONS  (PRN):  albuterol    90 MICROgram(s) HFA Inhaler 1 Puff(s) Inhalation every 4 hours PRN Shortness of Breath and/or Wheezing      ALLERGIES:  Allergies    No Known Allergies    Intolerances        LABS:                        12.6   4.08  )-----------( 110      ( 09 Jan 2023 06:06 )             38.3     01-09    143  |  107  |  14  ----------------------------<  104<H>  4.5   |  27  |  1.20    Ca    8.8      09 Jan 2023 06:06  Phos  3.5     01-09  Mg     2.2     01-09    TPro  5.8<L>  /  Alb  3.7  /  TBili  0.2  /  DBili  x   /  AST  45<H>  /  ALT  23  /  AlkPhos  61  01-09        RADIOLOGY & ADDITIONAL TESTS: Reviewed.

## 2023-01-09 NOTE — DISCHARGE NOTE PROVIDER - HOSPITAL COURSE
#Discharge: do not delete    77M PHMx hypothyroidism, schizoaffective disorder, asthma, HLD, BPH presented from [facility] living facility for fall after being found down for unknown period of time by staff now admitted for fall work up and COVID management.    Inpatient treatment course:   In the hospital, CTH neg and CT C-spine without acute fx. Orthostatics neg, UA neg. TTE demonstrated _______. Patient was medically optimized, stable and ready for discharge. Plan of care and return precautions were discussed with the patient who verbally stated understanding.     Problem List/Main Diagnoses:    1. Fall  Found down by [facility] staff for unknown period of time. Patient reports no recall of fall but does endorse having intermittent dizziness upon position change (standing from seated position). CTH and CT cervical without acute pathology. EKG with RBBB with 1st degree block. Likely 2/2 dehydration/orthostatic hypotension vs medication sedation vs cardiac etiology  - orthostatics neg; UA neg  - CK peaked at 1230 on 1/8; now downtrending, most recent CK 1005  - TTE demonstrated _____  - PT rec'd home, no needs    2. 2019 novel coronavirus disease (COVID-19).   Incidentally found to be COVID positive on admission. No respiratory symptoms, afebrile, normal WBC, saturating 95%+ on room air.  - Supportive care, no decadron or redemsivir indicated     3. Schizoaffective disorder.   Per surescripts, patient has picked up benzatropine 0.5mg, risperidone 2mg, buproprion 150mg, fluoxetine 20mg however patient unsure dose or frequency  - med rec performed with [facility] and pt received home buproprion 150mg QD, fluoxetine 20mg BID, risperidone, and benztropine while inpt    4. MADISON (acute kidney injury).   Cr 1.50 on admission, unknown baseline. Possibly 2/2 pre-renal etiology in setting of fall and decreased PO intake  - improving, Cr 1.20 today    5. Hypothyroidism.   On synthroid 100mcg QD. Unknown recent TSH level however no signs of myxedema coma such as hypothermia, respiratory depression, edema  - TSH 0.596.  - received home synthroid 100mcg QD    6. Hyperlipidemia.   - received home atorvastatin 20mg QD    7. Asthma.   Reports taking Advair 250/50 2 puffs BID. Has albuterol but has not needed to use  -  received symbicort 160/4.5 2 puffs BID and Albuterol PRN.    8. Anemia.   Hb 12.2 on admission, unknown baseline. No signs of active bleed. Received 1L NS in ED  - Hb remained stable throughout hospital course    9. Thrombocytopenia.   Platelet 99 on admission, unknown baseline. No known history of bone marrow disorder, immune system dysfunction, or known recent viral infection  - platelets remained stable throughout hospital course      New medications/therapies: none  Labs to be followed outpatient: CBC, BMP  Exam to be followed outpatient: physical exam    Discharge plan: discharge to home, no needs    Physical Exam on Discharge:  GENERAL: Awake, alert and interactive, no acute distress, appears comfortable  NEURO: A&Ox4, no focal deficits, 5/5 strength in all ext  HEENT: Normocephalic, atraumatic, no conjunctivitis or scleral icterus, oral mucosa moist  NECK: Supple  CARDIAC: Regular rate and rhythm, +S1/S2, no murmurs/rubs/gallops  PULM: Breathing comfortably on RA, clear to auscultation bilaterally, no wheezes/rales/rhonchi  ABDOMEN: Soft, nontender, nondistended, +bs  MSK: Range of motion grossly intact, no back tenderness  SKIN: Warm and dry, no rashes, lesions  VASC: Cap refil < 2 sec, 2+ peripheral pulses, no edema, no LE tenderness  Psych: Appropriate affect #Discharge: do not delete    77M PHMx hypothyroidism, schizoaffective disorder, asthma, HLD, BPH presented from [facility] living facility for fall after being found down for unknown period of time by staff now admitted for fall work up and COVID management.    Inpatient treatment course:   In the hospital, CTH neg and CT C-spine without acute fx. Orthostatics neg, UA neg. TTE demonstrated nml LV size and function (EF 65%); nml RV size/function. Patient was medically optimized, stable and ready for discharge. Plan of care and return precautions were discussed with the patient who verbally stated understanding.     Problem List/Main Diagnoses:    1. Fall  Found down by [facility] staff for unknown period of time. Patient reports no recall of fall but does endorse having intermittent dizziness upon position change (standing from seated position). CTH and CT cervical without acute pathology. EKG with RBBB with 1st degree block. Likely 2/2 dehydration/orthostatic hypotension vs medication sedation vs cardiac etiology  - orthostatics neg; UA neg  - CK peaked at 1230 on 1/8; now downtrending, most recent CK 1005  - TTE demonstrated nml LV size and function (EF 65%); nml RV size/function  - PT rec'd home, no needs    2. 2019 novel coronavirus disease (COVID-19).   Incidentally found to be COVID positive on admission. No respiratory symptoms, afebrile, normal WBC, saturating 95%+ on room air.  - Supportive care, no decadron or redemsivir indicated     3. Schizoaffective disorder.   Per surescripts, patient has picked up benzatropine 0.5mg, risperidone 2mg, buproprion 150mg, fluoxetine 20mg however patient unsure dose or frequency  - med rec performed with [facility] and pt received home buproprion 150mg QD, fluoxetine 20mg BID, risperidone, and benztropine while inpt    4. MADISON (acute kidney injury).   Cr 1.50 on admission, unknown baseline. Possibly 2/2 pre-renal etiology in setting of fall and decreased PO intake  - improving, Cr 1.20 today    5. Hypothyroidism.   On synthroid 100mcg QD. Unknown recent TSH level however no signs of myxedema coma such as hypothermia, respiratory depression, edema  - TSH 0.596.  - received home synthroid 100mcg QD    6. Hyperlipidemia.   - received home atorvastatin 20mg QD    7. Asthma.   Reports taking Advair 250/50 2 puffs BID. Has albuterol but has not needed to use  -  received symbicort 160/4.5 2 puffs BID and Albuterol PRN.    8. Anemia.   Hb 12.2 on admission, unknown baseline. No signs of active bleed. Received 1L NS in ED  - Hb remained stable throughout hospital course    9. Thrombocytopenia.   Platelet 99 on admission, unknown baseline. No known history of bone marrow disorder, immune system dysfunction, or known recent viral infection  - platelets remained stable throughout hospital course    New medications/therapies: none  Labs to be followed outpatient: CBC, BMP  Exam to be followed outpatient: physical exam    Discharge plan: discharge to home, no needs    Physical Exam on Discharge:  GENERAL: Awake, alert and interactive, no acute distress, appears comfortable  NEURO: A&Ox4, no focal deficits, 5/5 strength in all ext  HEENT: Normocephalic, atraumatic, no conjunctivitis or scleral icterus, oral mucosa moist  NECK: Supple  CARDIAC: Regular rate and rhythm, +S1/S2, no murmurs/rubs/gallops  PULM: Breathing comfortably on RA, clear to auscultation bilaterally, no wheezes/rales/rhonchi  ABDOMEN: Soft, nontender, nondistended, +bs  MSK: Range of motion grossly intact, no back tenderness  SKIN: Warm and dry, no rashes, lesions  VASC: Cap refil < 2 sec, 2+ peripheral pulses, no edema, no LE tenderness  Psych: Appropriate affect #Discharge: do not delete    77M PHMx hypothyroidism, schizoaffective disorder, asthma, HLD, BPH presented from [facility] living facility for fall after being found down for unknown period of time by staff now admitted for fall work up and COVID management.    Inpatient treatment course:   In the hospital, CTH neg and CT C-spine without acute fx. Orthostatics neg, UA neg. TTE demonstrated nml LV size and function (EF 65%); nml RV size/function. Patient was medically optimized, stable and ready for discharge. Plan of care and return precautions were discussed with the patient who verbally stated understanding.     Problem List/Main Diagnoses:    1. Fall  Found down by [facility] staff for unknown period of time. Patient reports no recall of fall but does endorse having intermittent dizziness upon position change (standing from seated position). CTH and CT cervical without acute pathology. EKG with RBBB with 1st degree block. Likely 2/2 dehydration/orthostatic hypotension vs medication sedation vs cardiac etiology  - orthostatics neg; UA neg  - CK peaked at 1230 on 1/8; now downtrending, most recent CK 1005  - TTE demonstrated nml LV size and function (EF 65%); nml RV size/function  - PT rec'd home, no needs    2. 2019 novel coronavirus disease (COVID-19).   Incidentally found to be COVID positive on admission. No respiratory symptoms, afebrile, normal WBC, saturating 95%+ on room air.  - Supportive care, no decadron or redemsivir indicated     3. Schizoaffective disorder.   Per surescripts, patient has picked up benzatropine 0.5mg, risperidone 2mg, buproprion 150mg, fluoxetine 20mg however patient unsure dose or frequency  - med rec performed with [facility] and pt received home buproprion 150mg QD, fluoxetine 20mg BID, risperidone, and benztropine while inpt    4. MADISON (acute kidney injury).   Cr 1.50 on admission, unknown baseline. Possibly 2/2 pre-renal etiology in setting of fall and decreased PO intake  - improving, Cr 1.20 today    5. Hypothyroidism.   On synthroid 100mcg QD. Unknown recent TSH level however no signs of myxedema coma such as hypothermia, respiratory depression, edema  - TSH 0.596.  - received home synthroid 100mcg QD    6. Hyperlipidemia.   - received home atorvastatin 20mg QD    7. Asthma.   Reports taking Advair 250/50 2 puffs BID. Has albuterol but has not needed to use  -  received symbicort 160/4.5 2 puffs BID and Albuterol PRN.    8. Anemia.   Hb 12.2 on admission, unknown baseline. No signs of active bleed. Received 1L NS in ED  - Hb remained stable throughout hospital course    9. Thrombocytopenia.   Platelet 99 on admission, unknown baseline. No known history of bone marrow disorder, immune system dysfunction, or known recent viral infection  - platelets remained stable throughout hospital course    New medications/therapies: none  Labs to be followed outpatient: CBC, BMP  Exam to be followed outpatient: physical exam    Discharge plan: discharge to home, no needs    Physical Exam on Discharge:  GENERAL: Awake, alert and interactive, no acute distress, appears comfortable  NEURO: A&Ox2-3, no focal deficits, 5/5 strength in all ext  HEENT: Normocephalic, atraumatic, no conjunctivitis or scleral icterus, oral mucosa moist  NECK: Supple  CARDIAC: Regular rate and rhythm, +S1/S2, no murmurs/rubs/gallops  PULM: Breathing comfortably on RA, clear to auscultation bilaterally, no wheezes/rales/rhonchi  ABDOMEN: Soft, nontender, nondistended, +bs  MSK: Range of motion grossly intact, no back tenderness  SKIN: Warm and dry, no rashes, lesions  VASC: Cap refil < 2 sec, 2+ peripheral pulses, no edema, no LE tenderness  Psych: Appropriate affect

## 2023-01-09 NOTE — PROGRESS NOTE ADULT - PROBLEM SELECTOR PLAN 3
Per surescripts, patient has picked up benzatropine 0.5mg, risperidone 2mg, buproprion 150mg, fluoxetine 20mg however patient unsure dose or frequency. Unable to reach [facility]    Plan:  - Formal med rec  - c/w buproprion 150mg QD and fluoxetine 20mg BID  - can restart risperidone and benztropine Per surescripts, patient has picked up benzatropine 0.5mg, risperidone 2mg, buproprion 150mg, fluoxetine 20mg however patient unsure dose or frequency. Unable to reach [facility]    Plan:  - c/w buproprion 150mg QD and fluoxetine 20mg BID  - restarted risperidone and benzatropine

## 2023-01-10 VITALS
HEART RATE: 59 BPM | SYSTOLIC BLOOD PRESSURE: 107 MMHG | DIASTOLIC BLOOD PRESSURE: 63 MMHG | OXYGEN SATURATION: 96 % | TEMPERATURE: 98 F | RESPIRATION RATE: 16 BRPM

## 2023-01-10 LAB
ALBUMIN SERPL ELPH-MCNC: 3.4 G/DL — SIGNIFICANT CHANGE UP (ref 3.3–5)
ALP SERPL-CCNC: 58 U/L — SIGNIFICANT CHANGE UP (ref 40–120)
ALT FLD-CCNC: 22 U/L — SIGNIFICANT CHANGE UP (ref 10–45)
ANION GAP SERPL CALC-SCNC: 9 MMOL/L — SIGNIFICANT CHANGE UP (ref 5–17)
AST SERPL-CCNC: 35 U/L — SIGNIFICANT CHANGE UP (ref 10–40)
BILIRUB SERPL-MCNC: 0.2 MG/DL — SIGNIFICANT CHANGE UP (ref 0.2–1.2)
BUN SERPL-MCNC: 10 MG/DL — SIGNIFICANT CHANGE UP (ref 7–23)
CALCIUM SERPL-MCNC: 8.2 MG/DL — LOW (ref 8.4–10.5)
CHLORIDE SERPL-SCNC: 106 MMOL/L — SIGNIFICANT CHANGE UP (ref 96–108)
CK SERPL-CCNC: 416 U/L — HIGH (ref 30–200)
CO2 SERPL-SCNC: 24 MMOL/L — SIGNIFICANT CHANGE UP (ref 22–31)
CREAT SERPL-MCNC: 0.94 MG/DL — SIGNIFICANT CHANGE UP (ref 0.5–1.3)
EGFR: 82 ML/MIN/1.73M2 — SIGNIFICANT CHANGE UP
GLUCOSE SERPL-MCNC: 96 MG/DL — SIGNIFICANT CHANGE UP (ref 70–99)
HCT VFR BLD CALC: 37.2 % — LOW (ref 39–50)
HGB BLD-MCNC: 12.3 G/DL — LOW (ref 13–17)
MAGNESIUM SERPL-MCNC: 2 MG/DL — SIGNIFICANT CHANGE UP (ref 1.6–2.6)
MCHC RBC-ENTMCNC: 30 PG — SIGNIFICANT CHANGE UP (ref 27–34)
MCHC RBC-ENTMCNC: 33.1 GM/DL — SIGNIFICANT CHANGE UP (ref 32–36)
MCV RBC AUTO: 90.7 FL — SIGNIFICANT CHANGE UP (ref 80–100)
NRBC # BLD: 0 /100 WBCS — SIGNIFICANT CHANGE UP (ref 0–0)
PHOSPHATE SERPL-MCNC: 2.8 MG/DL — SIGNIFICANT CHANGE UP (ref 2.5–4.5)
PLATELET # BLD AUTO: 110 K/UL — LOW (ref 150–400)
POTASSIUM SERPL-MCNC: 3.9 MMOL/L — SIGNIFICANT CHANGE UP (ref 3.5–5.3)
POTASSIUM SERPL-SCNC: 3.9 MMOL/L — SIGNIFICANT CHANGE UP (ref 3.5–5.3)
PROT SERPL-MCNC: 5.8 G/DL — LOW (ref 6–8.3)
RBC # BLD: 4.1 M/UL — LOW (ref 4.2–5.8)
RBC # FLD: 12.2 % — SIGNIFICANT CHANGE UP (ref 10.3–14.5)
SODIUM SERPL-SCNC: 139 MMOL/L — SIGNIFICANT CHANGE UP (ref 135–145)
WBC # BLD: 3.7 K/UL — LOW (ref 3.8–10.5)
WBC # FLD AUTO: 3.7 K/UL — LOW (ref 3.8–10.5)

## 2023-01-10 PROCEDURE — 36415 COLL VENOUS BLD VENIPUNCTURE: CPT

## 2023-01-10 PROCEDURE — 81003 URINALYSIS AUTO W/O SCOPE: CPT

## 2023-01-10 PROCEDURE — 84484 ASSAY OF TROPONIN QUANT: CPT

## 2023-01-10 PROCEDURE — 99285 EMERGENCY DEPT VISIT HI MDM: CPT | Mod: 25

## 2023-01-10 PROCEDURE — 80053 COMPREHEN METABOLIC PANEL: CPT

## 2023-01-10 PROCEDURE — 86850 RBC ANTIBODY SCREEN: CPT

## 2023-01-10 PROCEDURE — 99238 HOSP IP/OBS DSCHRG MGMT 30/<: CPT

## 2023-01-10 PROCEDURE — 84100 ASSAY OF PHOSPHORUS: CPT

## 2023-01-10 PROCEDURE — 36000 PLACE NEEDLE IN VEIN: CPT

## 2023-01-10 PROCEDURE — 85025 COMPLETE CBC W/AUTO DIFF WBC: CPT

## 2023-01-10 PROCEDURE — 70450 CT HEAD/BRAIN W/O DYE: CPT

## 2023-01-10 PROCEDURE — 82550 ASSAY OF CK (CPK): CPT

## 2023-01-10 PROCEDURE — 86901 BLOOD TYPING SEROLOGIC RH(D): CPT

## 2023-01-10 PROCEDURE — 72125 CT NECK SPINE W/O DYE: CPT

## 2023-01-10 PROCEDURE — 93321 DOPPLER ECHO F-UP/LMTD STD: CPT

## 2023-01-10 PROCEDURE — 82570 ASSAY OF URINE CREATININE: CPT

## 2023-01-10 PROCEDURE — 82962 GLUCOSE BLOOD TEST: CPT

## 2023-01-10 PROCEDURE — 87637 SARSCOV2&INF A&B&RSV AMP PRB: CPT

## 2023-01-10 PROCEDURE — 84300 ASSAY OF URINE SODIUM: CPT

## 2023-01-10 PROCEDURE — 86900 BLOOD TYPING SEROLOGIC ABO: CPT

## 2023-01-10 PROCEDURE — 80307 DRUG TEST PRSMV CHEM ANLYZR: CPT

## 2023-01-10 PROCEDURE — 87086 URINE CULTURE/COLONY COUNT: CPT

## 2023-01-10 PROCEDURE — 93005 ELECTROCARDIOGRAM TRACING: CPT

## 2023-01-10 PROCEDURE — 85027 COMPLETE CBC AUTOMATED: CPT

## 2023-01-10 PROCEDURE — 71045 X-RAY EXAM CHEST 1 VIEW: CPT

## 2023-01-10 PROCEDURE — 97161 PT EVAL LOW COMPLEX 20 MIN: CPT

## 2023-01-10 PROCEDURE — 82553 CREATINE MB FRACTION: CPT

## 2023-01-10 PROCEDURE — 84443 ASSAY THYROID STIM HORMONE: CPT

## 2023-01-10 PROCEDURE — 94640 AIRWAY INHALATION TREATMENT: CPT

## 2023-01-10 PROCEDURE — 83735 ASSAY OF MAGNESIUM: CPT

## 2023-01-10 RX ORDER — POLYETHYLENE GLYCOL 3350 17 G/17G
17 POWDER, FOR SOLUTION ORAL ONCE
Refills: 0 | Status: COMPLETED | OUTPATIENT
Start: 2023-01-10 | End: 2023-01-10

## 2023-01-10 RX ADMIN — Medication 0.5 MILLIGRAM(S): at 05:42

## 2023-01-10 RX ADMIN — BUDESONIDE AND FORMOTEROL FUMARATE DIHYDRATE 2 PUFF(S): 160; 4.5 AEROSOL RESPIRATORY (INHALATION) at 08:35

## 2023-01-10 RX ADMIN — RISPERIDONE 6 MILLIGRAM(S): 4 TABLET ORAL at 17:21

## 2023-01-10 RX ADMIN — Medication 20 MILLIGRAM(S): at 05:41

## 2023-01-10 RX ADMIN — Medication 100 MICROGRAM(S): at 05:41

## 2023-01-10 RX ADMIN — Medication 20 MILLIGRAM(S): at 17:21

## 2023-01-10 RX ADMIN — BUPROPION HYDROCHLORIDE 150 MILLIGRAM(S): 150 TABLET, EXTENDED RELEASE ORAL at 14:49

## 2023-01-10 RX ADMIN — Medication 0.5 MILLIGRAM(S): at 17:21

## 2023-01-10 NOTE — DISCHARGE NOTE NURSING/CASE MANAGEMENT/SOCIAL WORK - PATIENT PORTAL LINK FT
You can access the FollowMyHealth Patient Portal offered by [facility] by registering at the following website: [email or web address]. By joining [employer]’s FollowMyHealth portal, you will also be able to view your health information using other applications (apps) compatible with our system.

## 2023-01-10 NOTE — PROGRESS NOTE ADULT - PROBLEM SELECTOR PLAN 5
On synthroid 100mcg QD. Unknown recent TSH level however no signs of myxedema coma such as hypothermia, respiratory depression, edema    Plan:  - c/w synthroid 100mcg QD  - TSH 0.596

## 2023-01-10 NOTE — DISCHARGE NOTE NURSING/CASE MANAGEMENT/SOCIAL WORK - NSDCPEFALRISK_GEN_ALL_CORE
For information on Fall & Injury Prevention, visit: [email or web address] OR  [email or web address] OR  https://www.cdc.gov/steadi/patient.html 2 - Slight disability. Able to lookafter own affairs without assistance, but unable to carry out all previous activities

## 2023-01-10 NOTE — PROGRESS NOTE ADULT - SUBJECTIVE AND OBJECTIVE BOX
** INCOMPLETE **    OVERNIGHT EVENTS:     SUBJECTIVE:    VITAL SIGNS:  Vital Signs Last 24 Hrs  T(C): 36.6 (10 Hudson 2023 05:36), Max: 36.8 (09 Jan 2023 17:23)  T(F): 97.8 (10 Hudson 2023 05:36), Max: 98.3 (09 Jan 2023 17:23)  HR: 68 (10 Hudson 2023 05:36) (54 - 68)  BP: 141/64 (10 Hudson 2023 05:36) (110/61 - 142/66)  BP(mean): --  RR: 18 (10 Hudson 2023 05:36) (16 - 18)  SpO2: 98% (10 Hudson 2023 05:36) (96% - 99%)    Parameters below as of 10 Hudson 2023 05:36  Patient On (Oxygen Delivery Method): room air        PHYSICAL EXAM:  General: NAD; speaking in full sentences  HEENT: NC/AT; PERRL; EOMI; MMM  Neck: supple; no JVD  Cardiac: RRR; +S1/S2  Pulm: CTA B/L; no W/R/R  GI: soft, NT/ND, +BS  Extremities:  no edema, clubbing or cyanosis  Vasc: 2+ radial, DP pulses B/L  Neuro: AAOx3; no focal deficits    MEDICATIONS:  MEDICATIONS  (STANDING):  atorvastatin 20 milliGRAM(s) Oral at bedtime  benztropine 0.5 milliGRAM(s) Oral every 12 hours  budesonide 160 MICROgram(s)/formoterol 4.5 MICROgram(s) Inhaler 2 Puff(s) Inhalation two times a day  buPROPion XL (24-Hour) . 150 milliGRAM(s) Oral daily  FLUoxetine 20 milliGRAM(s) Oral two times a day  levothyroxine 100 MICROGram(s) Oral daily  risperiDONE   Tablet 6 milliGRAM(s) Oral every 24 hours  senna 1 Tablet(s) Oral at bedtime  tamsulosin 0.4 milliGRAM(s) Oral at bedtime    MEDICATIONS  (PRN):  albuterol    90 MICROgram(s) HFA Inhaler 1 Puff(s) Inhalation every 4 hours PRN Shortness of Breath and/or Wheezing      ALLERGIES:  Allergies    No Known Allergies    Intolerances        LABS:                        12.6   4.08  )-----------( 110      ( 09 Jan 2023 06:06 )             38.3     01-09    143  |  107  |  14  ----------------------------<  104<H>  4.5   |  27  |  1.20    Ca    8.8      09 Jan 2023 06:06  Phos  3.5     01-09  Mg     2.2     01-09    TPro  5.8<L>  /  Alb  3.7  /  TBili  0.2  /  DBili  x   /  AST  45<H>  /  ALT  23  /  AlkPhos  61  01-09        RADIOLOGY & ADDITIONAL TESTS: Reviewed. OVERNIGHT EVENTS:   No acute events overnight.    SUBJECTIVE:  Pt seen and examined at bedside. Feels well and has no complaints.    VITAL SIGNS:  Vital Signs Last 24 Hrs  T(C): 36.6 (10 Hudson 2023 05:36), Max: 36.8 (09 Jan 2023 17:23)  T(F): 97.8 (10 Hudson 2023 05:36), Max: 98.3 (09 Jan 2023 17:23)  HR: 68 (10 Hudson 2023 05:36) (54 - 68)  BP: 141/64 (10 Hudson 2023 05:36) (110/61 - 142/66)  BP(mean): --  RR: 18 (10 Hudson 2023 05:36) (16 - 18)  SpO2: 98% (10 Hudson 2023 05:36) (96% - 99%)    Parameters below as of 10 Hudson 2023 05:36  Patient On (Oxygen Delivery Method): room air        PHYSICAL EXAM:  General: NAD; speaking in full sentences  HEENT: NC/AT; PERRL; EOMI; MMM  Neck: supple; no JVD  Cardiac: RRR; +S1/S2  Pulm: CTA B/L; no W/R/R  GI: soft, NT/ND, +BS  Extremities:  no edema, clubbing or cyanosis  Vasc: 2+ radial, DP pulses B/L  Neuro: AAOx2; no focal deficits    MEDICATIONS:  MEDICATIONS  (STANDING):  atorvastatin 20 milliGRAM(s) Oral at bedtime  benztropine 0.5 milliGRAM(s) Oral every 12 hours  budesonide 160 MICROgram(s)/formoterol 4.5 MICROgram(s) Inhaler 2 Puff(s) Inhalation two times a day  buPROPion XL (24-Hour) . 150 milliGRAM(s) Oral daily  FLUoxetine 20 milliGRAM(s) Oral two times a day  levothyroxine 100 MICROGram(s) Oral daily  risperiDONE   Tablet 6 milliGRAM(s) Oral every 24 hours  senna 1 Tablet(s) Oral at bedtime  tamsulosin 0.4 milliGRAM(s) Oral at bedtime    MEDICATIONS  (PRN):  albuterol    90 MICROgram(s) HFA Inhaler 1 Puff(s) Inhalation every 4 hours PRN Shortness of Breath and/or Wheezing      ALLERGIES:  Allergies    No Known Allergies    Intolerances        LABS:                        12.6   4.08  )-----------( 110      ( 09 Jan 2023 06:06 )             38.3     01-09    143  |  107  |  14  ----------------------------<  104<H>  4.5   |  27  |  1.20    Ca    8.8      09 Jan 2023 06:06  Phos  3.5     01-09  Mg     2.2     01-09    TPro  5.8<L>  /  Alb  3.7  /  TBili  0.2  /  DBili  x   /  AST  45<H>  /  ALT  23  /  AlkPhos  61  01-09        RADIOLOGY & ADDITIONAL TESTS: Reviewed.

## 2023-01-10 NOTE — PROGRESS NOTE ADULT - ASSESSMENT
77M PHMx hypothyroidism, schizoaffective disorder, asthma, HLD, BPH presents from [facility] facility for fall after being found down for unknown period of time by staff now admitted for fall work up and COVID management.

## 2023-01-10 NOTE — PROGRESS NOTE ADULT - PROBLEM SELECTOR PLAN 4
Cr 1.50 on admission, unknown baseline. Possibly 2/2 pre-renal etiology in setting of fall and decreased PO intake    Plan:  - improving, Cr 1.20 today  - Monitor Cr  - Avoid nephrotoxic meds and renally dose meds Cr 1.50 on admission, unknown baseline. Possibly 2/2 pre-renal etiology in setting of fall and decreased PO intake    Plan:  - improving, Cr 0.94 today  - Monitor Cr  - Avoid nephrotoxic meds and renally dose meds

## 2023-01-10 NOTE — PROGRESS NOTE ADULT - PROBLEM SELECTOR PLAN 1
Found down by [facility] staff for unknown period of time. Patient reports no recall of fall but does endorse having intermittent dizziness upon position change (standing from seated position). CTH and CT cervical without acute pathology. EKG with RBBB with 1st degree block. Likely 2/2 dehydration/orthostatic hypotension vs medication sedation vs cardiac etiology    Plan:  - orthostatics neg  - UA neg  - CK peaked at 1230 on 1/8; now downtrending, most recent CK 1005  - f/u TTE  - Fall precautions  - PT rec'd home, no needs Found down by [facility] staff for unknown period of time. Patient reports no recall of fall but does endorse having intermittent dizziness upon position change (standing from seated position). CTH and CT cervical without acute pathology. EKG with RBBB with 1st degree block. Likely 2/2 dehydration/orthostatic hypotension vs medication sedation vs cardiac etiology    Plan:  - orthostatics neg  - UA neg  - CK peaked at 1230 on 1/8; now downtrending, most recent   - TTE nml LV, RV function; LVEF 65%  - Fall precautions  - PT rec'd home, no needs

## 2023-01-10 NOTE — PROGRESS NOTE ADULT - PROBLEM SELECTOR PLAN 6
On home atorvastatin 20mg QD    Plan:  - c/w atorvastatin 20mg QD

## 2023-01-10 NOTE — DISCHARGE NOTE NURSING/CASE MANAGEMENT/SOCIAL WORK - NSDCVIVACCINE_GEN_ALL_CORE_FT
Tdap; 06-Jul-2021 12:49; Candie Gan (SILVIA); [employer]; [id number] (Exp. Date: 18-Nov-2022); IntraMuscular; Deltoid Left.; 0.5 milliLiter(s); VIS (VIS Published: 09-May-2013, VIS Presented: 06-Jul-2021);

## 2023-01-10 NOTE — PROGRESS NOTE ADULT - PROBLEM SELECTOR PLAN 3
Per surescripts, patient has picked up benzatropine 0.5mg, risperidone 2mg, buproprion 150mg, fluoxetine 20mg however patient unsure dose or frequency. Unable to reach [facility]    Plan:  - c/w buproprion 150mg QD and fluoxetine 20mg BID  - restarted risperidone and benzatropine

## 2023-01-11 PROBLEM — Z00.00 ENCOUNTER FOR PREVENTIVE HEALTH EXAMINATION: Status: ACTIVE | Noted: 2023-01-11

## 2023-01-16 DIAGNOSIS — R55 SYNCOPE AND COLLAPSE: ICD-10-CM

## 2023-01-16 DIAGNOSIS — E78.5 HYPERLIPIDEMIA, UNSPECIFIED: ICD-10-CM

## 2023-01-16 DIAGNOSIS — G93.41 METABOLIC ENCEPHALOPATHY: ICD-10-CM

## 2023-01-16 DIAGNOSIS — N17.9 ACUTE KIDNEY FAILURE, UNSPECIFIED: ICD-10-CM

## 2023-01-16 DIAGNOSIS — Z86.16 PERSONAL HISTORY OF COVID-19: ICD-10-CM

## 2023-01-16 DIAGNOSIS — F25.9 SCHIZOAFFECTIVE DISORDER, UNSPECIFIED: ICD-10-CM

## 2023-01-16 DIAGNOSIS — I44.0 ATRIOVENTRICULAR BLOCK, FIRST DEGREE: ICD-10-CM

## 2023-01-16 DIAGNOSIS — J45.909 UNSPECIFIED ASTHMA, UNCOMPLICATED: ICD-10-CM

## 2023-01-16 DIAGNOSIS — U07.1 COVID-19: ICD-10-CM

## 2023-01-16 DIAGNOSIS — N40.0 BENIGN PROSTATIC HYPERPLASIA WITHOUT LOWER URINARY TRACT SYMPTOMS: ICD-10-CM
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
## 2023-01-16 DIAGNOSIS — E03.9 HYPOTHYROIDISM, UNSPECIFIED: ICD-10-CM

## 2023-01-16 DIAGNOSIS — D69.6 THROMBOCYTOPENIA, UNSPECIFIED: ICD-10-CM

## 2023-01-16 DIAGNOSIS — I45.10 UNSPECIFIED RIGHT BUNDLE-BRANCH BLOCK: ICD-10-CM

## 2023-01-16 DIAGNOSIS — D64.9 ANEMIA, UNSPECIFIED: ICD-10-CM

## 2023-02-02 NOTE — ED ADULT NURSE NOTE - NSFALLRSKOUTCOME_ED_ALL_ED
Spoke to patient wife, she said they can come to [facility]. Please find appointment for patient. Thank you Fide   [employer]

## 2023-06-22 ENCOUNTER — EMERGENCY (EMERGENCY)
Facility: HOSPITAL | Age: 78
LOS: 1 days | Discharge: HOME | End: 2023-06-22
Admitting: EMERGENCY MEDICINE
Payer: MEDICARE

## 2023-06-22 VITALS
TEMPERATURE: 98 F | WEIGHT: 169.98 LBS | HEART RATE: 73 BPM | HEIGHT: 68 IN | SYSTOLIC BLOOD PRESSURE: 170 MMHG | OXYGEN SATURATION: 96 % | RESPIRATION RATE: 16 BRPM | DIASTOLIC BLOOD PRESSURE: 69 MMHG

## 2023-06-22 VITALS
TEMPERATURE: 98 F | SYSTOLIC BLOOD PRESSURE: 117 MMHG | RESPIRATION RATE: 16 BRPM | HEART RATE: 55 BPM | DIASTOLIC BLOOD PRESSURE: 71 MMHG | OXYGEN SATURATION: 98 %

## 2023-06-22 DIAGNOSIS — Z90.89 ACQUIRED ABSENCE OF OTHER ORGANS: Chronic | ICD-10-CM

## 2023-06-22 PROCEDURE — 99283 EMERGENCY DEPT VISIT LOW MDM: CPT

## 2023-06-22 NOTE — ED PROVIDER NOTE - PATIENT PORTAL LINK FT
You can access the FollowMyHealth Patient Portal offered by [facility] by registering at the following website: [email or web address]. By joining [employer]’s FollowMyHealth portal, you will also be able to view your health information using other applications (apps) compatible with our system.

## 2023-06-22 NOTE — ED ADULT TRIAGE NOTE - CHIEF COMPLAINT QUOTE
Pt BIBA from residence with c/o epistaxis. Denies trauma or jury. Instructed to apply pressure in triage.

## 2023-06-22 NOTE — ED PROVIDER NOTE - OBJECTIVE STATEMENT
76 y/o M with PMHx of schizoaffective disorder, COPD, hypothyroidism, and HLD BIBA from residence c/o epistaxis from the left nostril for 30 minutes. Pt denies any trauma or injury. Denies fevers/chills, N/V/D, CP, SOB, lightheadedness, HA, or LOC.

## 2023-06-22 NOTE — ED PROVIDER NOTE - NSICDXPASTMEDICALHX_GEN_ALL_CORE_FT
PAST MEDICAL HISTORY:  Asthma     BPH (benign prostatic hyperplasia)     Hyperlipidemia     Hypothyroidism     Schizoaffective disorder       COPD (chronic obstructive pulmonary disease)

## 2023-06-22 NOTE — ED PROVIDER NOTE - CLINICAL SUMMARY MEDICAL DECISION MAKING FREE TEXT BOX
76 y/o M presents to ED c/o epistaxis. On exam, blood in the nares noted. Bleeding controlled with pressure. Will discharge with return precautions.  Symptoms

## 2023-06-26 DIAGNOSIS — Z90.09 ACQUIRED ABSENCE OF OTHER PART OF HEAD AND NECK: ICD-10-CM

## 2023-06-26 DIAGNOSIS — R04.0 EPISTAXIS: ICD-10-CM

## 2023-06-26 DIAGNOSIS — Z87.09 PERSONAL HISTORY OF OTHER DISEASES OF THE RESPIRATORY SYSTEM: ICD-10-CM

## 2023-06-26 DIAGNOSIS — E78.5 HYPERLIPIDEMIA, UNSPECIFIED: ICD-10-CM

## 2023-06-26 DIAGNOSIS — F25.9 SCHIZOAFFECTIVE DISORDER, UNSPECIFIED: ICD-10-CM

## 2023-06-26 DIAGNOSIS — E03.9 HYPOTHYROIDISM, UNSPECIFIED: ICD-10-CM

## 2023-06-26 DIAGNOSIS — Z87.448 PERSONAL HISTORY OF OTHER DISEASES OF URINARY SYSTEM: ICD-10-CM

## 2023-07-31 NOTE — ED ADULT NURSE NOTE - NSFALLRSKASSESSTYPE_ED_ALL_ED
"Procedure:  Esophageal manometry  Diagnosis: Dysphagia  Procedure Timin-8 weeks  *If within 4 weeks selected, please shyla as high priority*  *If greater than 12 weeks, please select "5-12 weeks" and delay sending until 2 months prior to requested date*  Provider: Dr. Myles Ribeiro  Location: [address]  Additional Scheduling Information: No scheduling concerns  Prep Specifications:Standard prep  Have you attached a patient to this message: yes "
125
Routine Reassessment

## 2024-09-10 NOTE — ED ADULT NURSE NOTE - MODE OF DISCHARGE
HCC coding opportunities       Chart reviewed, no opportunity found: CHART REVIEWED, NO OPPORTUNITY FOUND        Patients Insurance        Commercial Insurance: [employer]       
Ambulatory